# Patient Record
Sex: MALE | Race: WHITE | NOT HISPANIC OR LATINO | Employment: FULL TIME | URBAN - METROPOLITAN AREA
[De-identification: names, ages, dates, MRNs, and addresses within clinical notes are randomized per-mention and may not be internally consistent; named-entity substitution may affect disease eponyms.]

---

## 2017-08-29 ENCOUNTER — TELEPHONE (OUTPATIENT)
Dept: PRIMARY CARE CLINIC | Facility: CLINIC | Age: 22
End: 2017-08-29

## 2017-08-29 NOTE — TELEPHONE ENCOUNTER
----- Message from Lori Ferguson sent at 8/29/2017  1:16 PM CDT -----  Contact: Mother  Katia, mother [phone], Calling for same day appointment, had to leave work today due to abdominal pain, has a history of Colitis. Needs a note for work.  Please advise Thanks.    Pharmacy  [place], [place]

## 2017-08-29 NOTE — TELEPHONE ENCOUNTER
Spoke to mother regarding patients symptoms, advised if they get worse to take patient to ER but appt has been scheduled for tomorrow.

## 2017-09-14 RX ORDER — DEXTROAMPHETAMINE SACCHARATE, AMPHETAMINE ASPARTATE, DEXTROAMPHETAMINE SULFATE AND AMPHETAMINE SULFATE 7.5; 7.5; 7.5; 7.5 MG/1; MG/1; MG/1; MG/1
1 TABLET ORAL 2 TIMES DAILY
Qty: 30 TABLET | Refills: 0 | Status: SHIPPED | OUTPATIENT
Start: 2017-09-14 | End: 2017-10-19 | Stop reason: SDUPTHER

## 2017-09-14 NOTE — TELEPHONE ENCOUNTER
----- Message from Nancy Rivers sent at 9/14/2017  3:54 PM CDT -----  Contact: Mother   Katia, mother calling for patient. Is scheduled on 9/26/17 but will be going to [place] to work and would like to be seen on Friday 9/15/17 for stomach issues and a medication refill. Please advise. thanks.

## 2017-09-14 NOTE — TELEPHONE ENCOUNTER
Rx for 2-week supply of Adderall sent to pharmacy. F/U as scheduled later this month to address GI issues.

## 2017-10-17 ENCOUNTER — TELEPHONE (OUTPATIENT)
Dept: PRIMARY CARE CLINIC | Facility: CLINIC | Age: 22
End: 2017-10-17

## 2017-10-17 NOTE — TELEPHONE ENCOUNTER
Made an appointment with [facility] on Thursday but mom wanted me to send you a message to hopefully squeeze him in somewhere tomorrow?

## 2017-10-17 NOTE — TELEPHONE ENCOUNTER
----- Message from Christine Dye sent at 10/17/2017 11:04 AM CDT -----  Contact: mother kyle   Ear ache   Call back

## 2017-10-18 PROBLEM — F33.9 RECURRENT MAJOR DEPRESSIVE DISORDER: Status: ACTIVE | Noted: 2017-10-18

## 2017-10-18 PROBLEM — K21.9 GERD WITHOUT ESOPHAGITIS: Status: ACTIVE | Noted: 2017-10-18

## 2017-10-18 PROBLEM — F90.0 ATTENTION DEFICIT HYPERACTIVITY DISORDER (ADHD), PREDOMINANTLY INATTENTIVE TYPE: Status: ACTIVE | Noted: 2017-10-18

## 2017-10-18 PROBLEM — K31.84 GASTROPARESIS: Status: ACTIVE | Noted: 2017-10-18

## 2017-10-18 PROBLEM — F31.9 BIPOLAR DISORDER: Status: ACTIVE | Noted: 2017-10-18

## 2017-10-18 PROBLEM — K29.70 GASTRITIS: Status: ACTIVE | Noted: 2017-10-18

## 2017-10-18 PROBLEM — F41.9 ANXIETY: Status: ACTIVE | Noted: 2017-10-18

## 2017-10-18 PROBLEM — E66.3 OVERWEIGHT: Status: ACTIVE | Noted: 2017-10-18

## 2017-10-18 PROBLEM — K58.0 IRRITABLE BOWEL SYNDROME WITH DIARRHEA: Status: ACTIVE | Noted: 2017-10-18

## 2017-10-18 PROBLEM — N13.70 VESICOURETERAL REFLUX: Status: ACTIVE | Noted: 2017-10-18

## 2017-10-18 PROBLEM — R00.0 TACHYCARDIA: Status: ACTIVE | Noted: 2017-10-18

## 2017-10-19 ENCOUNTER — OFFICE VISIT (OUTPATIENT)
Dept: PRIMARY CARE CLINIC | Facility: CLINIC | Age: 22
End: 2017-10-19
Payer: MEDICAID

## 2017-10-19 VITALS
RESPIRATION RATE: 18 BRPM | TEMPERATURE: 98 F | HEART RATE: 74 BPM | DIASTOLIC BLOOD PRESSURE: 80 MMHG | SYSTOLIC BLOOD PRESSURE: 124 MMHG | WEIGHT: 277.19 LBS

## 2017-10-19 DIAGNOSIS — H92.02 ACUTE PAIN OF LEFT EAR: ICD-10-CM

## 2017-10-19 DIAGNOSIS — F90.0 ATTENTION DEFICIT HYPERACTIVITY DISORDER (ADHD), PREDOMINANTLY INATTENTIVE TYPE: Primary | ICD-10-CM

## 2017-10-19 PROCEDURE — 99999 PR PBB SHADOW E&M-EST. PATIENT-LVL III: CPT | Mod: PBBFAC,,, | Performed by: NURSE PRACTITIONER

## 2017-10-19 PROCEDURE — 99214 OFFICE O/P EST MOD 30 MIN: CPT | Mod: S$PBB,,, | Performed by: NURSE PRACTITIONER

## 2017-10-19 PROCEDURE — 99213 OFFICE O/P EST LOW 20 MIN: CPT | Mod: PBBFAC,PN | Performed by: NURSE PRACTITIONER

## 2017-10-19 RX ORDER — DEXTROAMPHETAMINE SACCHARATE, AMPHETAMINE ASPARTATE, DEXTROAMPHETAMINE SULFATE AND AMPHETAMINE SULFATE 7.5; 7.5; 7.5; 7.5 MG/1; MG/1; MG/1; MG/1
1 TABLET ORAL 2 TIMES DAILY
Qty: 60 TABLET | Refills: 0 | Status: SHIPPED | OUTPATIENT
Start: 2017-12-18 | End: 2018-02-09

## 2017-10-19 RX ORDER — DEXTROAMPHETAMINE SACCHARATE, AMPHETAMINE ASPARTATE, DEXTROAMPHETAMINE SULFATE AND AMPHETAMINE SULFATE 7.5; 7.5; 7.5; 7.5 MG/1; MG/1; MG/1; MG/1
1 TABLET ORAL 2 TIMES DAILY
Qty: 60 TABLET | Refills: 0 | Status: SHIPPED | OUTPATIENT
Start: 2017-10-19 | End: 2017-11-29 | Stop reason: SDUPTHER

## 2017-10-19 RX ORDER — DEXTROAMPHETAMINE SACCHARATE, AMPHETAMINE ASPARTATE MONOHYDRATE, DEXTROAMPHETAMINE SULFATE AND AMPHETAMINE SULFATE 7.5; 7.5; 7.5; 7.5 MG/1; MG/1; MG/1; MG/1
30 CAPSULE, EXTENDED RELEASE ORAL 2 TIMES DAILY
Qty: 60 CAPSULE | Refills: 0 | Status: SHIPPED | OUTPATIENT
Start: 2017-11-19 | End: 2017-10-19 | Stop reason: CLARIF

## 2017-10-19 RX ORDER — DEXTROAMPHETAMINE SACCHARATE, AMPHETAMINE ASPARTATE, DEXTROAMPHETAMINE SULFATE AND AMPHETAMINE SULFATE 7.5; 7.5; 7.5; 7.5 MG/1; MG/1; MG/1; MG/1
1 TABLET ORAL 2 TIMES DAILY
Qty: 60 TABLET | Refills: 0 | Status: SHIPPED | OUTPATIENT
Start: 2017-11-19 | End: 2018-01-05 | Stop reason: SDUPTHER

## 2017-10-19 NOTE — PROGRESS NOTES
Chief Complaint  Chief Complaint   Patient presents with    Medication Refill       HPI  Navneet Sanabria is a 21 y.o. male with multiple medical diagnoses as listed in the medical history and problem list that presents for medication refill and left ear pain.  Patient is new to me but is known to this clinic. Requesting medication refill on adderall. Taking BID without noted side effects. Currently out of medication. Has recently traveled out of town and lost the bottle. No CP, palpitations. No weight loss. Sleep good though only 5 hours/night. Concentration increased with medication. Complaining of left ear pain for approximately 2 days. Stated he got dirt of his ear canal. No fevers. No congestion. No cough. Reports sore throat. + smoker 1 ppd. No SOB.     PAST MEDICAL HISTORY:  Past Medical History:   Diagnosis Date    ADHD (attention deficit hyperactivity disorder)        PAST SURGICAL HISTORY:  History reviewed. No pertinent surgical history.    SOCIAL HISTORY:  Social History     Social History    Marital status: Single     Spouse name: N/A    Number of children: N/A    Years of education: N/A     Occupational History    Not on file.     Social History Main Topics    Smoking status: Never Smoker    Smokeless tobacco: Never Used    Alcohol use No    Drug use: No    Sexual activity: Yes     Other Topics Concern    Not on file     Social History Narrative    No narrative on file       FAMILY HISTORY:  Family History   Problem Relation Age of Onset    Family history unknown: Yes       ALLERGIES AND MEDICATIONS: updated and reviewed.  Review of patient's allergies indicates:  No Known Allergies  Current Outpatient Prescriptions   Medication Sig Dispense Refill    dextroamphetamine-amphetamine (ADDERALL) 30 mg Tab Take 1 tablet by mouth 2 (two) times daily. 60 tablet 0    ranitidine (ZANTAC) 300 MG tablet Take 1 tablet by mouth once daily.  2    [START ON 12/18/2017] dextroamphetamine-amphetamine 30 mg  Tab Take 1 tablet by mouth 2 (two) times daily. 60 tablet 0    [START ON 11/19/2017] dextroamphetamine-amphetamine 30 mg Tab Take 1 tablet by mouth 2 (two) times daily. 60 tablet 0     No current facility-administered medications for this visit.          ROS  Review of Systems   Constitutional: Negative for chills, fatigue and fever.   HENT: Positive for ear pain and sore throat. Negative for congestion, rhinorrhea and sinus pressure.    Respiratory: Negative for cough, chest tightness and shortness of breath.    Cardiovascular: Negative for chest pain and palpitations.   Gastrointestinal: Negative for abdominal pain, blood in stool, diarrhea, nausea and vomiting.   Genitourinary: Negative for dysuria, frequency, hematuria and urgency.   Musculoskeletal: Negative for arthralgias and joint swelling.   Skin: Negative for rash and wound.   Neurological: Negative for dizziness and headaches.   Psychiatric/Behavioral: Positive for sleep disturbance. Negative for dysphoric mood. The patient is not nervous/anxious.          PHYSICAL EXAM  Vitals:    10/19/17 1406   BP: 124/80   BP Location: Right arm   Patient Position: Sitting   BP Method: Medium (Automatic)   Pulse: 74   Resp: 18   Temp: 98.1 °F (36.7 °C)   TempSrc: Oral   Weight: 125.7 kg (277 lb 3.2 oz)    There is no height or weight on file to calculate BMI.  Weight: 125.7 kg (277 lb 3.2 oz)         Physical Exam   Constitutional: He is oriented to person, place, and time. He appears well-developed and well-nourished.   HENT:   Head: Normocephalic.   Right Ear: Tympanic membrane normal.   Left Ear: Tympanic membrane normal.   Ears:    Mouth/Throat: Uvula is midline and mucous membranes are normal. Posterior oropharyngeal erythema present. Tonsils are 0 on the right. Tonsils are 0 on the left.   Eyes: Conjunctivae are normal.   Cardiovascular: Normal rate, regular rhythm, normal heart sounds and normal pulses.    No murmur heard.  Pulses:       Radial pulses are 2+  on the right side, and 2+ on the left side.   Pulmonary/Chest: Effort normal and breath sounds normal. He has no wheezes.   Abdominal: Soft. Bowel sounds are normal. There is no tenderness.   Musculoskeletal: He exhibits no edema.   Lymphadenopathy:     He has no cervical adenopathy.   Neurological: He is alert and oriented to person, place, and time.   Skin: Skin is warm and dry. No rash noted.   Psychiatric: He has a normal mood and affect.         Health Maintenance       Date Due Completion Date    Lipid Panel 1997 ---    HPV Vaccines (1 of 3 - Male 3 Dose Series) 12/16/2008 ---    TETANUS VACCINE 12/16/2015 ---    Influenza Vaccine 08/01/2017 ---            Assessment & Plan    Navneet was seen today for medication refill.    Diagnoses and all orders for this visit:    Attention deficit hyperactivity disorder (ADHD), predominantly inattentive type  -     dextroamphetamine-amphetamine (ADDERALL) 30 mg Tab; Take 1 tablet by mouth 2 (two) times daily.  -     dextroamphetamine-amphetamine 30 mg Tab; Take 1 tablet by mouth 2 (two) times daily.  -     dextroamphetamine-amphetamine 30 mg Tab; Take 1 tablet by mouth 2 (two) times daily.    Acute pain of left ear        - Instructed not to place qtip or other objects in ear, causing irritation        - Antihistamines for allergic s/s and decr pressure in TM      Follow-up: Return in about 3 months (around 1/19/2018).

## 2017-11-29 ENCOUNTER — OFFICE VISIT (OUTPATIENT)
Dept: PRIMARY CARE CLINIC | Facility: CLINIC | Age: 22
End: 2017-11-29
Payer: MEDICAID

## 2017-11-29 ENCOUNTER — CLINICAL SUPPORT (OUTPATIENT)
Dept: PRIMARY CARE CLINIC | Facility: CLINIC | Age: 22
End: 2017-11-29
Payer: MEDICAID

## 2017-11-29 VITALS
SYSTOLIC BLOOD PRESSURE: 120 MMHG | DIASTOLIC BLOOD PRESSURE: 75 MMHG | TEMPERATURE: 98 F | BODY MASS INDEX: 35.16 KG/M2 | WEIGHT: 274 LBS | HEART RATE: 94 BPM | OXYGEN SATURATION: 95 % | RESPIRATION RATE: 18 BRPM | HEIGHT: 74 IN

## 2017-11-29 DIAGNOSIS — S39.012A LUMBOSACRAL STRAIN, INITIAL ENCOUNTER: ICD-10-CM

## 2017-11-29 DIAGNOSIS — R30.0 DYSURIA: Primary | ICD-10-CM

## 2017-11-29 PROCEDURE — 87086 URINE CULTURE/COLONY COUNT: CPT

## 2017-11-29 PROCEDURE — 99213 OFFICE O/P EST LOW 20 MIN: CPT | Mod: PBBFAC,PN | Performed by: FAMILY MEDICINE

## 2017-11-29 PROCEDURE — 99214 OFFICE O/P EST MOD 30 MIN: CPT | Mod: S$PBB,,, | Performed by: FAMILY MEDICINE

## 2017-11-29 PROCEDURE — 81001 URINALYSIS AUTO W/SCOPE: CPT | Mod: PBBFAC,PN | Performed by: FAMILY MEDICINE

## 2017-11-29 PROCEDURE — 87591 N.GONORRHOEAE DNA AMP PROB: CPT

## 2017-11-29 PROCEDURE — 99999 PR PBB SHADOW E&M-EST. PATIENT-LVL III: CPT | Mod: PBBFAC,,, | Performed by: FAMILY MEDICINE

## 2017-11-29 RX ORDER — ETODOLAC 400 MG/1
400 TABLET, FILM COATED ORAL 2 TIMES DAILY PRN
Qty: 30 TABLET | Refills: 1 | Status: SHIPPED | OUTPATIENT
Start: 2017-11-29 | End: 2018-01-07 | Stop reason: SDUPTHER

## 2017-11-29 RX ORDER — LAMOTRIGINE 100 MG/1
1 TABLET ORAL DAILY
Refills: 2 | COMMUNITY
Start: 2017-11-20 | End: 2017-12-31 | Stop reason: SDUPTHER

## 2017-11-29 NOTE — PROGRESS NOTES
"Subjective:       Patient ID: Navneet Sanabria is a 21 y.o. male.    Chief Complaint: Back Pain and Dysuria    Dysuria    This is a new problem. The current episode started in the past 7 days. The problem occurs intermittently. The problem has been unchanged. The quality of the pain is described as burning. The pain is mild. There has been no fever. He is sexually active. Pertinent negatives include no chills, discharge, flank pain, frequency, hematuria, sweats, urgency or vomiting. There is no history of kidney stones, STD or a urological procedure.   Back Pain   This is a new problem. The current episode started more than 1 month ago. The problem has been gradually worsening since onset. The pain is present in the lumbar spine. The quality of the pain is described as aching. The pain does not radiate. The symptoms are aggravated by bending. Associated symptoms include dysuria. Pertinent negatives include no bowel incontinence or leg pain. He has tried nothing for the symptoms.     Review of Systems   Constitutional: Negative for chills.   Gastrointestinal: Negative for bowel incontinence and vomiting.   Genitourinary: Positive for dysuria. Negative for flank pain, frequency, hematuria and urgency.   Musculoskeletal: Positive for back pain.       Objective:      Vitals:    11/29/17 1441   BP: 120/75   BP Location: Left arm   Patient Position: Sitting   BP Method: Large (Automatic)   Pulse: 94   Resp: 18   Temp: 98.1 °F (36.7 °C)   TempSrc: Oral   SpO2: 95%   Weight: 124.3 kg (274 lb)   Height: 6' 2" (1.88 m)     Physical Exam   Constitutional: He is oriented to person, place, and time. He appears well-developed and well-nourished.   HENT:   Head: Normocephalic and atraumatic.   Neck: Neck supple. No JVD present.   Cardiovascular: Normal rate, regular rhythm and normal heart sounds.    Pulmonary/Chest: Effort normal and breath sounds normal.   Abdominal: Soft. He exhibits no distension. There is no tenderness. There is " no CVA tenderness.   Musculoskeletal: He exhibits no edema.        Lumbar back: He exhibits no tenderness, no bony tenderness, no edema, no deformity and no spasm.   Neurological: He is alert and oriented to person, place, and time. He has normal strength and normal reflexes. No sensory deficit.   Skin: Skin is warm and dry.   Nursing note and vitals reviewed.      Assessment:       1. Dysuria    2. Lumbosacral strain, initial encounter        Plan:       Dysuria  Comments:  no evidence of UTI; will get culture, STD test  Orders:  -     POCT urinalysis, dipstick or tablet reag  -     Urine culture  -     C. trachomatis/N. gonorrhoeae by AMP DNA Urine    Lumbosacral strain, initial encounter  -     etodolac (LODINE) 400 MG tablet; Take 1 tablet (400 mg total) by mouth 2 (two) times daily as needed (pain).  Dispense: 30 tablet; Refill: 1      Medication List with Changes/Refills   New Medications    ETODOLAC (LODINE) 400 MG TABLET    Take 1 tablet (400 mg total) by mouth 2 (two) times daily as needed (pain).   Current Medications    DEXTROAMPHETAMINE-AMPHETAMINE 30 MG TAB    Take 1 tablet by mouth 2 (two) times daily.    DEXTROAMPHETAMINE-AMPHETAMINE 30 MG TAB    Take 1 tablet by mouth 2 (two) times daily.    LAMOTRIGINE (LAMICTAL) 100 MG TABLET    Take 1 tablet by mouth once daily.    RANITIDINE (ZANTAC) 300 MG TABLET    Take 1 tablet by mouth once daily.   Discontinued Medications    DEXTROAMPHETAMINE-AMPHETAMINE (ADDERALL) 30 MG TAB    Take 1 tablet by mouth 2 (two) times daily.

## 2017-11-30 LAB
BACTERIA UR CULT: NORMAL
BILIRUB SERPL-MCNC: NORMAL MG/DL
BLOOD URINE, POC: NORMAL
C TRACH DNA SPEC QL NAA+PROBE: NOT DETECTED
COLOR, POC UA: YELLOW
GLUCOSE UR QL STRIP: NORMAL
KETONES UR QL STRIP: NORMAL
LEUKOCYTE ESTERASE URINE, POC: NORMAL
N GONORRHOEA DNA SPEC QL NAA+PROBE: NOT DETECTED
NITRITE, POC UA: NORMAL
PH, POC UA: NORMAL
PROTEIN, POC: NORMAL
SPECIFIC GRAVITY, POC UA: 5
UROBILINOGEN, POC UA: NORMAL

## 2018-01-02 RX ORDER — LAMOTRIGINE 100 MG/1
TABLET ORAL
Qty: 30 TABLET | Refills: 5 | Status: SHIPPED | OUTPATIENT
Start: 2018-01-02 | End: 2018-06-05 | Stop reason: SDUPTHER

## 2018-01-05 RX ORDER — DEXTROAMPHETAMINE SACCHARATE, AMPHETAMINE ASPARTATE, DEXTROAMPHETAMINE SULFATE AND AMPHETAMINE SULFATE 7.5; 7.5; 7.5; 7.5 MG/1; MG/1; MG/1; MG/1
1 TABLET ORAL 2 TIMES DAILY
Qty: 60 TABLET | Refills: 0 | Status: SHIPPED | OUTPATIENT
Start: 2018-01-05 | End: 2018-02-09

## 2018-01-07 DIAGNOSIS — S39.012A LUMBOSACRAL STRAIN, INITIAL ENCOUNTER: ICD-10-CM

## 2018-01-08 RX ORDER — ETODOLAC 400 MG/1
TABLET, FILM COATED ORAL
Qty: 30 TABLET | Refills: 0 | Status: SHIPPED | OUTPATIENT
Start: 2018-01-08 | End: 2018-01-10 | Stop reason: ALTCHOICE

## 2018-01-10 ENCOUNTER — OFFICE VISIT (OUTPATIENT)
Dept: PRIMARY CARE CLINIC | Facility: CLINIC | Age: 23
End: 2018-01-10
Payer: MEDICAID

## 2018-01-10 VITALS
RESPIRATION RATE: 20 BRPM | BODY MASS INDEX: 33.03 KG/M2 | DIASTOLIC BLOOD PRESSURE: 68 MMHG | SYSTOLIC BLOOD PRESSURE: 124 MMHG | HEART RATE: 91 BPM | OXYGEN SATURATION: 98 % | HEIGHT: 74 IN | TEMPERATURE: 98 F | WEIGHT: 257.38 LBS

## 2018-01-10 DIAGNOSIS — M79.672 LEFT FOOT PAIN: Primary | ICD-10-CM

## 2018-01-10 DIAGNOSIS — Z71.6 ENCOUNTER FOR SMOKING CESSATION COUNSELING: ICD-10-CM

## 2018-01-10 PROCEDURE — 99214 OFFICE O/P EST MOD 30 MIN: CPT | Mod: PBBFAC,PN | Performed by: NURSE PRACTITIONER

## 2018-01-10 PROCEDURE — 99999 PR PBB SHADOW E&M-EST. PATIENT-LVL IV: CPT | Mod: PBBFAC,,, | Performed by: NURSE PRACTITIONER

## 2018-01-10 PROCEDURE — 99214 OFFICE O/P EST MOD 30 MIN: CPT | Mod: S$PBB,,, | Performed by: NURSE PRACTITIONER

## 2018-01-10 RX ORDER — BUPROPION HYDROCHLORIDE 150 MG/1
150 TABLET, EXTENDED RELEASE ORAL 2 TIMES DAILY
Qty: 60 TABLET | Refills: 2 | Status: SHIPPED | OUTPATIENT
Start: 2018-01-10 | End: 2018-02-09

## 2018-01-10 RX ORDER — NAPROXEN 500 MG/1
500 TABLET ORAL 2 TIMES DAILY
Qty: 30 TABLET | Refills: 0 | Status: SHIPPED | OUTPATIENT
Start: 2018-01-10 | End: 2018-01-22 | Stop reason: SDUPTHER

## 2018-01-10 NOTE — PROGRESS NOTES
Chief Complaint  Chief Complaint   Patient presents with    Foot Pain     left foot at arch and right foot little toe       HPI  Navneet Sanabria is a 22 y.o. male with multiple medical diagnoses as listed in the medical history and problem list that presents for left foot pain.  Patient is new to me but is known to this clinic with her last appointment being 11/29/2017.  Complains of new onset left foot pain X 1 week. No associated trauma or fall. Pain with toe flexion and extension. Pain with walking. No associated swelling. Noted bruising initially. Patient taking ibuprofen 200 mg with no noted relief. H/o smoking for past 4 years 1/2 ppd. No shortness of breath. Expresses interest in quitting smoking at this time. H/o MDD/bipolar currently on lamictal. Patient denies cp, palpitations.       PAST MEDICAL HISTORY:  Past Medical History:   Diagnosis Date    ADHD (attention deficit hyperactivity disorder)        PAST SURGICAL HISTORY:  Past Surgical History:   Procedure Laterality Date    KIDNEY SURGERY         SOCIAL HISTORY:  Social History     Social History    Marital status: Single     Spouse name: N/A    Number of children: N/A    Years of education: N/A     Occupational History    Not on file.     Social History Main Topics    Smoking status: Never Smoker    Smokeless tobacco: Never Used    Alcohol use No    Drug use: No    Sexual activity: Yes     Other Topics Concern    Not on file     Social History Narrative    No narrative on file       FAMILY HISTORY:  Family History   Problem Relation Age of Onset    Family history unknown: Yes       ALLERGIES AND MEDICATIONS: updated and reviewed.  Review of patient's allergies indicates:  No Known Allergies  Current Outpatient Prescriptions   Medication Sig Dispense Refill    dextroamphetamine-amphetamine 30 mg Tab Take 1 tablet by mouth 2 (two) times daily. 60 tablet 0    dextroamphetamine-amphetamine 30 mg Tab Take 1 tablet by mouth 2 (two) times daily.  "60 tablet 0    lamoTRIgine (LAMICTAL) 100 MG tablet TAKE 1 TABLET BY MOUTH EVERY DAY 30 tablet 5    ranitidine (ZANTAC) 300 MG tablet TAKE 1 TABLET BY MOUTH EVERY DAY 30 tablet 2    buPROPion (WELLBUTRIN SR) 150 MG TBSR 12 hr tablet Take 1 tablet (150 mg total) by mouth 2 (two) times daily. 60 tablet 2    naproxen (NAPROSYN) 500 MG tablet Take 1 tablet (500 mg total) by mouth 2 (two) times daily. 30 tablet 0     No current facility-administered medications for this visit.          ROS  Review of Systems   Constitutional: Negative for chills, fatigue and fever.   HENT: Negative for congestion, rhinorrhea, sinus pressure and sore throat.    Respiratory: Positive for cough. Negative for chest tightness and shortness of breath.    Cardiovascular: Negative for chest pain and palpitations.   Gastrointestinal: Negative for abdominal pain, blood in stool, diarrhea, nausea and vomiting.   Genitourinary: Negative for dysuria, frequency, hematuria and urgency.   Musculoskeletal: Positive for arthralgias, gait problem and joint swelling.   Skin: Negative for rash and wound.   Neurological: Negative for dizziness and headaches.   Psychiatric/Behavioral: Negative for dysphoric mood and sleep disturbance. The patient is not nervous/anxious.          PHYSICAL EXAM  Vitals:    01/10/18 1500   BP: 124/68   BP Location: Right arm   Patient Position: Sitting   BP Method: Medium (Automatic)   Pulse: 91   Resp: 20   Temp: 98.1 °F (36.7 °C)   SpO2: 98%   Weight: 116.8 kg (257 lb 6.4 oz)   Height: 6' 2" (1.88 m)    Body mass index is 33.05 kg/m².  Weight: 116.8 kg (257 lb 6.4 oz)   Height: 6' 2" (188 cm)     Physical Exam   Constitutional: He is oriented to person, place, and time. He appears well-developed and well-nourished.   HENT:   Head: Normocephalic.   Right Ear: Tympanic membrane normal.   Left Ear: Tympanic membrane normal.   Mouth/Throat: Uvula is midline, oropharynx is clear and moist and mucous membranes are normal.   Eyes: " Conjunctivae are normal.   Cardiovascular: Normal rate, regular rhythm, normal heart sounds and normal pulses.    No murmur heard.  Pulses:       Radial pulses are 2+ on the right side, and 2+ on the left side.   Pulmonary/Chest: Effort normal. He has wheezes. He has no rhonchi.   Abdominal: Soft. Bowel sounds are normal. There is no tenderness.   Musculoskeletal: He exhibits no edema.        Feet:    Lymphadenopathy:     He has no cervical adenopathy.   Neurological: He is alert and oriented to person, place, and time.   Skin: Skin is warm and dry. No rash noted.   Psychiatric: He has a normal mood and affect.         Health Maintenance       Date Due Completion Date    Lipid Panel 1997 ---    HPV Vaccines (1 of 3 - Male 3 Dose Series) 12/16/2008 ---    TETANUS VACCINE 12/16/2015 ---    Influenza Vaccine 08/01/2017 ---            Assessment & Plan    Navneet was seen today for foot pain.    Diagnoses and all orders for this visit:    Left foot pain  -     X-Ray Foot Complete 3 view Left; Future  -     naproxen (NAPROSYN) 500 MG tablet; Take 1 tablet (500 mg total) by mouth 2 (two) times daily.    Encounter for smoking cessation counseling  -     buPROPion (WELLBUTRIN SR) 150 MG TBSR 12 hr tablet; Take 1 tablet (150 mg total) by mouth 2 (two) times daily.  - Patient does not qualify for cessation trust        Follow-up: Return if symptoms worsen or fail to improve.

## 2018-01-22 DIAGNOSIS — M79.672 LEFT FOOT PAIN: ICD-10-CM

## 2018-01-22 DIAGNOSIS — S39.012A LUMBOSACRAL STRAIN, INITIAL ENCOUNTER: ICD-10-CM

## 2018-01-22 RX ORDER — ETODOLAC 400 MG/1
TABLET, FILM COATED ORAL
Qty: 30 TABLET | Refills: 0 | Status: SHIPPED | OUTPATIENT
Start: 2018-01-22 | End: 2018-02-09

## 2018-01-22 RX ORDER — NAPROXEN 500 MG/1
TABLET ORAL
Qty: 30 TABLET | Refills: 0 | Status: SHIPPED | OUTPATIENT
Start: 2018-01-22 | End: 2018-05-15

## 2018-02-09 ENCOUNTER — TELEPHONE (OUTPATIENT)
Dept: PRIMARY CARE CLINIC | Facility: CLINIC | Age: 23
End: 2018-02-09

## 2018-02-09 NOTE — TELEPHONE ENCOUNTER
Spoke to pts mom and she states that the pt is urinating blood and he only has one kidney. I notified the mom that the pt needs to go to the ER because Dr Reddy is not in the office. She states understanding

## 2018-02-20 ENCOUNTER — TELEPHONE (OUTPATIENT)
Dept: PRIMARY CARE CLINIC | Facility: CLINIC | Age: 23
End: 2018-02-20

## 2018-02-20 NOTE — TELEPHONE ENCOUNTER
----- Message from Lori Ferguson sent at 2/9/2018  9:23 AM CST -----  Contact: Mother  Katia, mother [phone], Calling because her son only has one kidney, he woke of this morning with blood in his urine. Mother is very concerned. Please call her. Thanks.

## 2018-03-06 ENCOUNTER — OFFICE VISIT (OUTPATIENT)
Dept: PRIMARY CARE CLINIC | Facility: CLINIC | Age: 23
End: 2018-03-06
Payer: MEDICAID

## 2018-03-06 VITALS
DIASTOLIC BLOOD PRESSURE: 73 MMHG | HEART RATE: 76 BPM | OXYGEN SATURATION: 97 % | BODY MASS INDEX: 31.06 KG/M2 | TEMPERATURE: 98 F | RESPIRATION RATE: 18 BRPM | WEIGHT: 242 LBS | HEIGHT: 74 IN | SYSTOLIC BLOOD PRESSURE: 115 MMHG

## 2018-03-06 DIAGNOSIS — F90.0 ATTENTION DEFICIT HYPERACTIVITY DISORDER (ADHD), PREDOMINANTLY INATTENTIVE TYPE: Primary | ICD-10-CM

## 2018-03-06 DIAGNOSIS — N13.70 VESICOURETERAL REFLUX: ICD-10-CM

## 2018-03-06 LAB
BILIRUB SERPL-MCNC: NORMAL MG/DL
BLOOD URINE, POC: NORMAL
COLOR, POC UA: YELLOW
GLUCOSE UR QL STRIP: NORMAL
KETONES UR QL STRIP: NORMAL
LEUKOCYTE ESTERASE URINE, POC: NORMAL
NITRITE, POC UA: NORMAL
PH, POC UA: 5
PROTEIN, POC: NORMAL
SPECIFIC GRAVITY, POC UA: 1
UROBILINOGEN, POC UA: NORMAL

## 2018-03-06 PROCEDURE — 99213 OFFICE O/P EST LOW 20 MIN: CPT | Mod: S$PBB,,, | Performed by: FAMILY MEDICINE

## 2018-03-06 PROCEDURE — 81001 URINALYSIS AUTO W/SCOPE: CPT | Mod: PBBFAC,PN | Performed by: FAMILY MEDICINE

## 2018-03-06 PROCEDURE — 99213 OFFICE O/P EST LOW 20 MIN: CPT | Mod: PBBFAC,PN | Performed by: FAMILY MEDICINE

## 2018-03-06 PROCEDURE — 99999 PR PBB SHADOW E&M-EST. PATIENT-LVL III: CPT | Mod: PBBFAC,,, | Performed by: FAMILY MEDICINE

## 2018-03-06 RX ORDER — DEXTROAMPHETAMINE SACCHARATE, AMPHETAMINE ASPARTATE, DEXTROAMPHETAMINE SULFATE AND AMPHETAMINE SULFATE 7.5; 7.5; 7.5; 7.5 MG/1; MG/1; MG/1; MG/1
1 TABLET ORAL 2 TIMES DAILY
Qty: 60 TABLET | Refills: 0 | Status: SHIPPED | OUTPATIENT
Start: 2018-04-05 | End: 2018-05-15

## 2018-03-06 RX ORDER — DEXTROAMPHETAMINE SACCHARATE, AMPHETAMINE ASPARTATE, DEXTROAMPHETAMINE SULFATE AND AMPHETAMINE SULFATE 7.5; 7.5; 7.5; 7.5 MG/1; MG/1; MG/1; MG/1
1 TABLET ORAL 2 TIMES DAILY
COMMUNITY
End: 2018-03-06 | Stop reason: SDUPTHER

## 2018-03-06 RX ORDER — DEXTROAMPHETAMINE SACCHARATE, AMPHETAMINE ASPARTATE, DEXTROAMPHETAMINE SULFATE AND AMPHETAMINE SULFATE 7.5; 7.5; 7.5; 7.5 MG/1; MG/1; MG/1; MG/1
1 TABLET ORAL 2 TIMES DAILY
Qty: 60 TABLET | Refills: 0 | Status: SHIPPED | OUTPATIENT
Start: 2018-03-06 | End: 2018-05-15

## 2018-03-06 RX ORDER — DEXTROAMPHETAMINE SACCHARATE, AMPHETAMINE ASPARTATE, DEXTROAMPHETAMINE SULFATE AND AMPHETAMINE SULFATE 7.5; 7.5; 7.5; 7.5 MG/1; MG/1; MG/1; MG/1
1 TABLET ORAL 2 TIMES DAILY
Qty: 60 TABLET | Refills: 0 | Status: SHIPPED | OUTPATIENT
Start: 2018-05-05 | End: 2018-07-26 | Stop reason: ALTCHOICE

## 2018-03-06 NOTE — PROGRESS NOTES
"Subjective:       Patient ID: Navneet Sanabria is a 22 y.o. male.    Chief Complaint: Medication Refill and Hematuria (pt was seen in the ER on 02/09. He is not having this issue anymore )    Patient presents for ADHD medication refill.  Prescribed medication has been working well with regard to efficacy (improved focus and attention, less easily distracted).  Tolerating medication well without significant adverse side effects (loss of appetite, insomnia, irritability, chest pain/palpitations).  History of vesicoureteral reflux, hasn't seen urologist in years.  Had an episode of gross hematuria several weeks ago, went to the emergency room.  CT done at that time showed no acute abnormalities.  No recurrent hematuria.  Does report occasional urinary urgency and discomfort.  No fevers or chills.      Review of Systems   Constitutional: Negative for chills and fever.   Eyes: Negative for visual disturbance.   Cardiovascular: Negative for chest pain and palpitations.   Genitourinary: Positive for hematuria.   Psychiatric/Behavioral: Negative for sleep disturbance.       Objective:      Vitals:    03/06/18 1100   BP: 115/73   BP Location: Left arm   Patient Position: Sitting   BP Method: Large (Automatic)   Pulse: 76   Resp: 18   Temp: 97.9 °F (36.6 °C)   TempSrc: Oral   SpO2: 97%   Weight: 109.8 kg (242 lb)   Height: 6' 2" (1.88 m)     Physical Exam   Constitutional: He is oriented to person, place, and time. He appears well-developed and well-nourished.   HENT:   Head: Normocephalic and atraumatic.   Cardiovascular: Normal rate, regular rhythm and normal heart sounds.    Pulmonary/Chest: Effort normal and breath sounds normal.   Abdominal: Soft. Bowel sounds are normal. There is no tenderness. There is no CVA tenderness.   Musculoskeletal: He exhibits no edema.   Neurological: He is alert and oriented to person, place, and time.   Skin: Skin is warm and dry.   Nursing note and vitals reviewed.      Assessment:       1. " Attention deficit hyperactivity disorder (ADHD), predominantly inattentive type    2. Vesicoureteral reflux        Plan:       Attention deficit hyperactivity disorder (ADHD), predominantly inattentive type  -     dextroamphetamine-amphetamine (ADDERALL) 30 mg Tab; Take 1 tablet by mouth 2 (two) times daily.  Dispense: 60 tablet; Refill: 0  -     dextroamphetamine-amphetamine 30 mg Tab; Take 1 tablet by mouth 2 (two) times daily.  Dispense: 60 tablet; Refill: 0  -     dextroamphetamine-amphetamine 30 mg Tab; Take 1 tablet by mouth 2 (two) times daily.  Dispense: 60 tablet; Refill: 0    Vesicoureteral reflux  -     POCT urinalysis, dipstick or tablet reag  -     Ambulatory referral to Urology      Medication List with Changes/Refills   New Medications    DEXTROAMPHETAMINE-AMPHETAMINE 30 MG TAB    Take 1 tablet by mouth 2 (two) times daily.    DEXTROAMPHETAMINE-AMPHETAMINE 30 MG TAB    Take 1 tablet by mouth 2 (two) times daily.   Current Medications    LAMOTRIGINE (LAMICTAL) 100 MG TABLET    TAKE 1 TABLET BY MOUTH EVERY DAY    NAPROXEN (NAPROSYN) 500 MG TABLET    TAKE 1 TABLET(500 MG) BY MOUTH TWICE DAILY    RANITIDINE (ZANTAC) 300 MG TABLET    TAKE 1 TABLET BY MOUTH EVERY DAY   Changed and/or Refilled Medications    Modified Medication Previous Medication    DEXTROAMPHETAMINE-AMPHETAMINE (ADDERALL) 30 MG TAB dextroamphetamine-amphetamine (ADDERALL) 30 mg Tab       Take 1 tablet by mouth 2 (two) times daily.    Take 1 tablet by mouth 2 (two) times daily.   Discontinued Medications    TAMSULOSIN (FLOMAX) 0.4 MG CP24    Take 1 capsule (0.4 mg total) by mouth once daily.

## 2018-04-11 DIAGNOSIS — Z71.6 ENCOUNTER FOR SMOKING CESSATION COUNSELING: ICD-10-CM

## 2018-04-11 RX ORDER — BUPROPION HYDROCHLORIDE 150 MG/1
TABLET, EXTENDED RELEASE ORAL
Qty: 60 TABLET | Refills: 0 | Status: SHIPPED | OUTPATIENT
Start: 2018-04-11 | End: 2018-05-10 | Stop reason: SDUPTHER

## 2018-05-10 DIAGNOSIS — Z71.6 ENCOUNTER FOR SMOKING CESSATION COUNSELING: ICD-10-CM

## 2018-05-10 RX ORDER — BUPROPION HYDROCHLORIDE 150 MG/1
TABLET, EXTENDED RELEASE ORAL
Qty: 60 TABLET | Refills: 0 | Status: SHIPPED | OUTPATIENT
Start: 2018-05-10 | End: 2018-05-15

## 2018-05-15 ENCOUNTER — CLINICAL SUPPORT (OUTPATIENT)
Dept: PRIMARY CARE CLINIC | Facility: CLINIC | Age: 23
End: 2018-05-15
Payer: MEDICAID

## 2018-05-15 ENCOUNTER — OFFICE VISIT (OUTPATIENT)
Dept: PRIMARY CARE CLINIC | Facility: CLINIC | Age: 23
End: 2018-05-15
Payer: MEDICAID

## 2018-05-15 VITALS
SYSTOLIC BLOOD PRESSURE: 121 MMHG | WEIGHT: 241 LBS | BODY MASS INDEX: 30.93 KG/M2 | OXYGEN SATURATION: 97 % | TEMPERATURE: 98 F | HEART RATE: 64 BPM | RESPIRATION RATE: 18 BRPM | DIASTOLIC BLOOD PRESSURE: 70 MMHG | HEIGHT: 74 IN

## 2018-05-15 DIAGNOSIS — R00.2 PALPITATIONS: ICD-10-CM

## 2018-05-15 DIAGNOSIS — Z79.899 HIGH RISK MEDICATION USE: ICD-10-CM

## 2018-05-15 DIAGNOSIS — R42 DIZZINESS: Primary | ICD-10-CM

## 2018-05-15 DIAGNOSIS — R42 DIZZINESS: ICD-10-CM

## 2018-05-15 LAB
ALBUMIN SERPL BCP-MCNC: 3.9 G/DL
ALP SERPL-CCNC: 89 U/L
ALT SERPL W/O P-5'-P-CCNC: 13 U/L
ANION GAP SERPL CALC-SCNC: 8 MMOL/L
AST SERPL-CCNC: 15 U/L
BASOPHILS # BLD AUTO: 0.06 K/UL
BASOPHILS NFR BLD: 0.9 %
BILIRUB SERPL-MCNC: 0.3 MG/DL
BUN SERPL-MCNC: 5 MG/DL
CALCIUM SERPL-MCNC: 9.1 MG/DL
CHLORIDE SERPL-SCNC: 105 MMOL/L
CO2 SERPL-SCNC: 27 MMOL/L
CREAT SERPL-MCNC: 1 MG/DL
DIFFERENTIAL METHOD: NORMAL
EOSINOPHIL # BLD AUTO: 0.2 K/UL
EOSINOPHIL NFR BLD: 3.4 %
ERYTHROCYTE [DISTWIDTH] IN BLOOD BY AUTOMATED COUNT: 13.7 %
EST. GFR  (AFRICAN AMERICAN): >60 ML/MIN/1.73 M^2
EST. GFR  (NON AFRICAN AMERICAN): >60 ML/MIN/1.73 M^2
GLUCOSE SERPL-MCNC: 98 MG/DL
HCT VFR BLD AUTO: 44.3 %
HGB BLD-MCNC: 14.5 G/DL
IMM GRANULOCYTES # BLD AUTO: 0.02 K/UL
IMM GRANULOCYTES NFR BLD AUTO: 0.3 %
LYMPHOCYTES # BLD AUTO: 1.6 K/UL
LYMPHOCYTES NFR BLD: 25 %
MCH RBC QN AUTO: 29.5 PG
MCHC RBC AUTO-ENTMCNC: 32.7 G/DL
MCV RBC AUTO: 90 FL
MONOCYTES # BLD AUTO: 0.6 K/UL
MONOCYTES NFR BLD: 8.8 %
NEUTROPHILS # BLD AUTO: 4 K/UL
NEUTROPHILS NFR BLD: 61.6 %
NRBC BLD-RTO: 0 /100 WBC
PLATELET # BLD AUTO: 237 K/UL
PMV BLD AUTO: 11.2 FL
POTASSIUM SERPL-SCNC: 4.1 MMOL/L
PROT SERPL-MCNC: 6.6 G/DL
RBC # BLD AUTO: 4.92 M/UL
SODIUM SERPL-SCNC: 140 MMOL/L
TSH SERPL DL<=0.005 MIU/L-ACNC: 1.76 UIU/ML
WBC # BLD AUTO: 6.49 K/UL

## 2018-05-15 PROCEDURE — 99999 PR PBB SHADOW E&M-EST. PATIENT-LVL II: CPT | Mod: PBBFAC,,,

## 2018-05-15 PROCEDURE — 80053 COMPREHEN METABOLIC PANEL: CPT

## 2018-05-15 PROCEDURE — 99999 PR PBB SHADOW E&M-EST. PATIENT-LVL III: CPT | Mod: PBBFAC,,, | Performed by: FAMILY MEDICINE

## 2018-05-15 PROCEDURE — 80307 DRUG TEST PRSMV CHEM ANLYZR: CPT

## 2018-05-15 PROCEDURE — 85025 COMPLETE CBC W/AUTO DIFF WBC: CPT

## 2018-05-15 PROCEDURE — 99212 OFFICE O/P EST SF 10 MIN: CPT | Mod: PBBFAC,27,PN

## 2018-05-15 PROCEDURE — 99213 OFFICE O/P EST LOW 20 MIN: CPT | Mod: PBBFAC,PN | Performed by: FAMILY MEDICINE

## 2018-05-15 PROCEDURE — 84443 ASSAY THYROID STIM HORMONE: CPT

## 2018-05-15 PROCEDURE — 99213 OFFICE O/P EST LOW 20 MIN: CPT | Mod: S$PBB,,, | Performed by: FAMILY MEDICINE

## 2018-05-15 NOTE — PROGRESS NOTES
"Subjective:       Patient ID: Navneet Sanabria is a 22 y.o. male.    Chief Complaint: Dizziness (x1 week) and Palpitations (x1 week)    Started feeling dizzy off and on last Friday.  First happened while seated at rest, then again in the shower.  Thought he was going to pass out, though no syncope.  Since then, has had a few more less severe dizzy spells, but has also been having palpitations and chest tightness, nonexertional.  No recent illness or injury.      Review of Systems   Constitutional: Negative for fever.   HENT: Negative for trouble swallowing.    Eyes: Negative for visual disturbance.   Respiratory: Positive for chest tightness. Negative for shortness of breath.    Cardiovascular: Positive for palpitations. Negative for leg swelling.   Gastrointestinal: Negative for nausea and vomiting.   Genitourinary: Negative for difficulty urinating.   Musculoskeletal: Negative for joint swelling.   Skin: Negative for rash.   Neurological: Positive for dizziness. Negative for seizures, syncope and headaches.   Psychiatric/Behavioral: Negative for agitation and confusion.       Objective:      Vitals:    05/15/18 1252   BP: 121/70   BP Location: Left arm   Patient Position: Sitting   BP Method: Large (Automatic)   Pulse: 64   Resp: 18   Temp: 98.4 °F (36.9 °C)   TempSrc: Oral   SpO2: 97%   Weight: 109.3 kg (241 lb)   Height: 6' 2" (1.88 m)     Physical Exam   Constitutional: He is oriented to person, place, and time. He appears well-developed and well-nourished.   HENT:   Head: Normocephalic and atraumatic.   Mouth/Throat: Oropharynx is clear and moist.   Eyes: EOM are normal. Pupils are equal, round, and reactive to light.   Neck: Neck supple. No JVD present. Carotid bruit is not present.   Cardiovascular: Normal rate, regular rhythm and normal heart sounds.    Pulses:       Radial pulses are 2+ on the right side, and 2+ on the left side.   Pulmonary/Chest: Effort normal and breath sounds normal.   Abdominal: Soft. " Bowel sounds are normal. There is no tenderness.   Musculoskeletal: He exhibits no edema.   Neurological: He is alert and oriented to person, place, and time.   Skin: Skin is warm and dry.   Psychiatric: He has a normal mood and affect. His behavior is normal.   Nursing note and vitals reviewed.      Assessment:       1. Dizziness    2. Palpitations    3. High risk medication use        Plan:       Dizziness  -     CBC auto differential; Future; Expected date: 05/15/2018  -     Comprehensive metabolic panel; Future; Expected date: 05/15/2018  -     Transthoracic echo (TTE) complete; Future  -     Holter monitor - 24 hour; Future    Palpitations  Comments:  EKG normal.  Patient advised to hold Adderall pending outcome of cardiac workup  Orders:  -     CBC auto differential; Future; Expected date: 05/15/2018  -     Comprehensive metabolic panel; Future; Expected date: 05/15/2018  -     TSH; Future; Expected date: 05/15/2018  -     POCT EKG 12-LEAD (NOT FOR [facility] USE)  -     Transthoracic echo (TTE) complete; Future  -     Holter monitor - 24 hour; Future    High risk medication use  -     [facility] Drug Screen      Medication List with Changes/Refills   Current Medications    DEXTROAMPHETAMINE-AMPHETAMINE 30 MG TAB    Take 1 tablet by mouth 2 (two) times daily.    LAMOTRIGINE (LAMICTAL) 100 MG TABLET    TAKE 1 TABLET BY MOUTH EVERY DAY    RANITIDINE (ZANTAC) 300 MG TABLET    TAKE 1 TABLET BY MOUTH EVERY DAY   Discontinued Medications    BUPROPION (WELLBUTRIN SR) 150 MG TBSR 12 HR TABLET    TAKE 1 TABLET(150 MG) BY MOUTH TWICE DAILY    DEXTROAMPHETAMINE-AMPHETAMINE (ADDERALL) 30 MG TAB    Take 1 tablet by mouth 2 (two) times daily.    DEXTROAMPHETAMINE-AMPHETAMINE 30 MG TAB    Take 1 tablet by mouth 2 (two) times daily.    NAPROXEN (NAPROSYN) 500 MG TABLET    TAKE 1 TABLET(500 MG) BY MOUTH TWICE DAILY

## 2018-05-18 LAB
6MAM UR QL: NOT DETECTED
7AMINOCLONAZEPAM UR QL: NOT DETECTED
A-OH ALPRAZ UR QL: NOT DETECTED
ALPRAZ UR QL: NOT DETECTED
AMPHET UR QL SCN: PRESENT
ANNOTATION COMMENT IMP: NORMAL
ANNOTATION COMMENT IMP: NORMAL
BARBITURATES UR QL: NOT DETECTED
BUPRENORPHINE UR QL: NOT DETECTED
BZE UR QL: NOT DETECTED
CARBOXYTHC UR QL: PRESENT
CARISOPRODOL UR QL: NOT DETECTED
CLONAZEPAM UR QL: NOT DETECTED
CODEINE UR QL: NOT DETECTED
CREAT UR-MCNC: 75.4 MG/DL (ref 20–400)
DIAZEPAM UR QL: NOT DETECTED
ETHYL GLUCURONIDE UR QL: NOT DETECTED
FENTANYL UR QL: NOT DETECTED
HYDROCODONE UR QL: PRESENT
HYDROMORPHONE UR QL: NOT DETECTED
LORAZEPAM UR QL: NOT DETECTED
MDA UR QL: NOT DETECTED
MDEA UR QL: NOT DETECTED
MDMA UR QL: NOT DETECTED
ME-PHENIDATE UR QL: NOT DETECTED
MEPERIDINE UR QL: NOT DETECTED
METHADONE UR QL: NOT DETECTED
METHAMPHET UR QL: NOT DETECTED
MIDAZOLAM UR QL SCN: NOT DETECTED
MORPHINE UR QL: NOT DETECTED
NORBUPRENORPHINE UR QL CFM: NOT DETECTED
NORDIAZEPAM UR QL: NOT DETECTED
NORFENTANYL UR QL: NOT DETECTED
NORHYDROCODONE UR QL CFM: PRESENT
NOROXYCODONE UR QL CFM: PRESENT
NOROXYMORPHONE: NOT DETECTED
OXAZEPAM UR QL: NOT DETECTED
OXYCODONE UR QL: PRESENT
OXYMORPHONE UR QL: NOT DETECTED
PATHOLOGY STUDY: NORMAL
PCP UR QL: NOT DETECTED
PHENTERMINE UR QL: NOT DETECTED
PROPOXYPH UR QL: NOT DETECTED
SERVICE CMNT-IMP: NORMAL
TAPENTADOL UR QL SCN: NOT DETECTED
TAPENTADOL-O-SULF: NOT DETECTED
TEMAZEPAM UR QL: NOT DETECTED
TRAMADOL UR QL: NOT DETECTED
ZOLPIDEM UR QL: NOT DETECTED

## 2018-05-21 ENCOUNTER — TELEPHONE (OUTPATIENT)
Dept: PRIMARY CARE CLINIC | Facility: CLINIC | Age: 23
End: 2018-05-21

## 2018-05-21 DIAGNOSIS — R89.2 ABNORMAL DRUG SCREEN: ICD-10-CM

## 2018-05-21 NOTE — TELEPHONE ENCOUNTER
Patient says he saw a cardiologist and was started on Metoprolol a while back because the Adderall was causing his HR to be elevated and gives him palpitations. I notified that patient that his pulse has been fine as well as his BP but I would ask you.

## 2018-05-21 NOTE — TELEPHONE ENCOUNTER
I have never prescribed metoprolol for him, nor do I feel it is appropriate to prescribe a medication to treat a side effect of another medication.  It would be best to get off the offending agent (stop taking Adderall).  In any case, his urine drug screen was positive for hydrocodone and oxycodone, as well as Adderall and marijuana.  I am not particularly concerned about the marijuana, but there is no record in the [place] prescription monitoring database indicating that he has been prescribed oxycodone any time in the past 2 years.  There are some prescriptions for hydrocodone from a dentist.  Because of this, I will no longer be prescribing Adderall or any other controlled substances for him.

## 2018-05-21 NOTE — TELEPHONE ENCOUNTER
----- Message from Lori Ferguson sent at 5/21/2018 11:32 AM CDT -----  Contact: Mother  Type:  RX Refill Request    Who Called:  Katia, mother  Refill or New Rx:  Refill  RX Name and Strength:  Metoprolol 50 mg  How is the patient currently taking it? (ex. 1XDay):  once a day  Is this a 30 day or 90 day RX:  30  Preferred Pharmacy with phone number:    [facility] [address] [place] [id number]  Phone: [phone] Fax: [phone]    Local or Mail Order:  Local  Ordering Provider:  Dr Reddy  [facility] Call Back Number:  [phone]  Additional Information:  Please advise. Thanks.

## 2018-06-05 RX ORDER — LAMOTRIGINE 100 MG/1
TABLET ORAL
Qty: 30 TABLET | Refills: 5 | Status: SHIPPED | OUTPATIENT
Start: 2018-06-05 | End: 2019-01-17 | Stop reason: SDUPTHER

## 2018-06-09 DIAGNOSIS — Z71.6 ENCOUNTER FOR SMOKING CESSATION COUNSELING: ICD-10-CM

## 2018-06-11 RX ORDER — BUPROPION HYDROCHLORIDE 150 MG/1
TABLET, EXTENDED RELEASE ORAL
Qty: 60 TABLET | Refills: 0 | Status: SHIPPED | OUTPATIENT
Start: 2018-06-11 | End: 2018-07-09 | Stop reason: SDUPTHER

## 2018-07-09 DIAGNOSIS — Z71.6 ENCOUNTER FOR SMOKING CESSATION COUNSELING: ICD-10-CM

## 2018-07-09 RX ORDER — BUPROPION HYDROCHLORIDE 150 MG/1
TABLET, EXTENDED RELEASE ORAL
Qty: 60 TABLET | Refills: 0 | Status: SHIPPED | OUTPATIENT
Start: 2018-07-09 | End: 2018-07-26 | Stop reason: ALTCHOICE

## 2018-07-26 ENCOUNTER — OFFICE VISIT (OUTPATIENT)
Dept: PRIMARY CARE CLINIC | Facility: CLINIC | Age: 23
End: 2018-07-26
Payer: MEDICAID

## 2018-07-26 ENCOUNTER — TELEPHONE (OUTPATIENT)
Dept: PRIMARY CARE CLINIC | Facility: CLINIC | Age: 23
End: 2018-07-26

## 2018-07-26 VITALS
HEART RATE: 74 BPM | HEIGHT: 74 IN | TEMPERATURE: 99 F | SYSTOLIC BLOOD PRESSURE: 131 MMHG | RESPIRATION RATE: 18 BRPM | DIASTOLIC BLOOD PRESSURE: 81 MMHG | WEIGHT: 222.5 LBS | BODY MASS INDEX: 28.55 KG/M2 | OXYGEN SATURATION: 98 %

## 2018-07-26 DIAGNOSIS — F41.9 ANXIETY: ICD-10-CM

## 2018-07-26 DIAGNOSIS — F31.9 BIPOLAR AFFECTIVE DISORDER, REMISSION STATUS UNSPECIFIED: ICD-10-CM

## 2018-07-26 DIAGNOSIS — Z00.00 ROUTINE PHYSICAL EXAMINATION: Primary | ICD-10-CM

## 2018-07-26 DIAGNOSIS — F33.2 SEVERE EPISODE OF RECURRENT MAJOR DEPRESSIVE DISORDER, WITHOUT PSYCHOTIC FEATURES: ICD-10-CM

## 2018-07-26 PROCEDURE — 99213 OFFICE O/P EST LOW 20 MIN: CPT | Mod: S$PBB,,, | Performed by: NURSE PRACTITIONER

## 2018-07-26 PROCEDURE — 99213 OFFICE O/P EST LOW 20 MIN: CPT | Mod: PBBFAC,PN | Performed by: NURSE PRACTITIONER

## 2018-07-26 PROCEDURE — 99999 PR PBB SHADOW E&M-EST. PATIENT-LVL III: CPT | Mod: PBBFAC,,, | Performed by: NURSE PRACTITIONER

## 2018-07-26 NOTE — PROGRESS NOTES
Chief Complaint  Chief Complaint   Patient presents with    Annual Exam       HPI  Navneet Sanabria is a 22 y.o. male with multiple medical diagnoses as listed in the medical history and problem list that presents for routine physical examination.    Patient presents requesting routine physical examination for admission to an outpatient psychiatric facility [facility].  No complaints at this time.  Blood pressure and heart rate within normal limits.  Afebrile.  Patient is a current smoker.  Denies illegal drug use.  No alcohol.  Patient up-to-date on all vaccinations.  Last Tdap 2009.    Depressive disorder-reports compliance with current dose of Lamictal.  Reports mood as usual with moderate anxiety and dysphoria..  Denies suicidal homicidal ideation at this time.            PAST MEDICAL HISTORY:  Past Medical History:   Diagnosis Date    ADHD (attention deficit hyperactivity disorder)        PAST SURGICAL HISTORY:  Past Surgical History:   Procedure Laterality Date    KIDNEY SURGERY         SOCIAL HISTORY:  Social History     Social History    Marital status: Single     Spouse name: N/A    Number of children: N/A    Years of education: N/A     Occupational History    Not on file.     Social History Main Topics    Smoking status: Current Every Day Smoker     Packs/day: 1.00     Types: Cigarettes    Smokeless tobacco: Never Used    Alcohol use No    Drug use: No    Sexual activity: Yes     Other Topics Concern    Not on file     Social History Narrative    No narrative on file       FAMILY HISTORY:  Family History   Problem Relation Age of Onset    Family history unknown: Yes       ALLERGIES AND MEDICATIONS: updated and reviewed.  Review of patient's allergies indicates:  No Known Allergies  Current Outpatient Prescriptions   Medication Sig Dispense Refill    lamoTRIgine (LAMICTAL) 100 MG tablet TAKE 1 TABLET BY MOUTH EVERY DAY 30 tablet 5    ranitidine (ZANTAC) 300 MG tablet TAKE 1  "TABLET BY MOUTH EVERY DAY 30 tablet 5     No current facility-administered medications for this visit.          ROS  Review of Systems   Constitutional: Negative for chills, fatigue and fever.   HENT: Negative for congestion, rhinorrhea, sinus pressure and sore throat.    Respiratory: Negative for cough, chest tightness and shortness of breath.    Cardiovascular: Negative for chest pain and palpitations.   Gastrointestinal: Negative for abdominal pain, blood in stool, diarrhea, nausea and vomiting.   Genitourinary: Negative for dysuria, frequency, hematuria and urgency.   Musculoskeletal: Negative for arthralgias and back pain.   Skin: Negative for rash and wound.   Neurological: Negative for dizziness and headaches.   Psychiatric/Behavioral: Positive for dysphoric mood and sleep disturbance. The patient is nervous/anxious.          PHYSICAL EXAM  Vitals:    07/26/18 1140   BP: 131/81   BP Location: Right arm   Patient Position: Sitting   BP Method: Medium (Automatic)   Pulse: 74   Resp: 18   Temp: 98.6 °F (37 °C)   TempSrc: Oral   SpO2: 98%   Weight: 100.9 kg (222 lb 8 oz)   Height: 6' 2" (1.88 m)    Body mass index is 28.57 kg/m².  Weight: 100.9 kg (222 lb 8 oz)   Height: 6' 2" (188 cm)     Physical Exam   Constitutional: He is oriented to person, place, and time. He appears well-developed and well-nourished.   HENT:   Head: Normocephalic.   Right Ear: Tympanic membrane normal.   Left Ear: Tympanic membrane normal.   Mouth/Throat: Uvula is midline, oropharynx is clear and moist and mucous membranes are normal.       Eyes: Conjunctivae are normal.   Cardiovascular: Normal rate, regular rhythm, normal heart sounds and normal pulses.    No murmur heard.  Pulses:       Radial pulses are 2+ on the right side, and 2+ on the left side.   No LE swelling noted   Pulmonary/Chest: Effort normal and breath sounds normal. He has no wheezes.   Abdominal: Soft. Bowel sounds are normal. There is no tenderness.   Musculoskeletal: " He exhibits no edema.   Lymphadenopathy:     He has no cervical adenopathy.   Neurological: He is alert and oriented to person, place, and time.   Skin: Skin is warm and dry. No rash noted.   Psychiatric: He is withdrawn. He exhibits a depressed mood.         Health Maintenance       Date Due Completion Date    Lipid Panel 1997 ---    HPV Vaccines (1 of 3 - Male 3 Dose Series) 12/16/2008 ---    TETANUS VACCINE 12/16/2015 ---    Pneumococcal PPSV23 (Medium Risk) (1) 12/16/2015 ---    Influenza Vaccine 08/01/2018 ---            Assessment & Plan    Navneet was seen today for annual exam.    Diagnoses and all orders for this visit:    Routine physical examination    Bipolar affective disorder, remission status unspecified  Continue Lamictal.  Complete referral to [facility] psychiatric.    Severe episode of recurrent major depressive disorder, without psychotic features  Previous referral to [facility] psychiatric.    Anxiety        Follow-up: Follow-up in about 6 months (around 1/26/2019).

## 2018-07-26 NOTE — TELEPHONE ENCOUNTER
"patient is trying to be seen at [facility] and they are telling the patients mom he needs a "physical' first. Quita says she will see the patient today   "

## 2018-07-26 NOTE — TELEPHONE ENCOUNTER
----- Message from Sara Bell sent at 7/25/2018  5:58 PM CDT -----  Contact: Pt mom Katia can be reached at [phone]  Mom is calling to speak with the nurse concerning getting pt an appt for physical.    Mom is calling pt is not good mental health right now please assist mom.      Thank you!

## 2018-08-06 DIAGNOSIS — Z71.6 ENCOUNTER FOR SMOKING CESSATION COUNSELING: ICD-10-CM

## 2018-08-06 RX ORDER — BUPROPION HYDROCHLORIDE 150 MG/1
TABLET, EXTENDED RELEASE ORAL
Qty: 60 TABLET | Refills: 0 | Status: SHIPPED | OUTPATIENT
Start: 2018-08-06 | End: 2018-08-17

## 2018-08-17 ENCOUNTER — OFFICE VISIT (OUTPATIENT)
Dept: PRIMARY CARE CLINIC | Facility: CLINIC | Age: 23
End: 2018-08-17
Payer: MEDICAID

## 2018-08-17 VITALS
SYSTOLIC BLOOD PRESSURE: 121 MMHG | BODY MASS INDEX: 28.98 KG/M2 | HEIGHT: 74 IN | DIASTOLIC BLOOD PRESSURE: 75 MMHG | TEMPERATURE: 98 F | HEART RATE: 96 BPM | OXYGEN SATURATION: 97 % | WEIGHT: 225.81 LBS | RESPIRATION RATE: 18 BRPM

## 2018-08-17 DIAGNOSIS — G89.29 CHRONIC MIDLINE LOW BACK PAIN WITH BILATERAL SCIATICA: Primary | ICD-10-CM

## 2018-08-17 DIAGNOSIS — M54.41 CHRONIC MIDLINE LOW BACK PAIN WITH BILATERAL SCIATICA: Primary | ICD-10-CM

## 2018-08-17 DIAGNOSIS — M54.42 CHRONIC MIDLINE LOW BACK PAIN WITH BILATERAL SCIATICA: Primary | ICD-10-CM

## 2018-08-17 PROCEDURE — 99214 OFFICE O/P EST MOD 30 MIN: CPT | Mod: PBBFAC,PN | Performed by: INTERNAL MEDICINE

## 2018-08-17 PROCEDURE — 99213 OFFICE O/P EST LOW 20 MIN: CPT | Mod: S$PBB,,, | Performed by: INTERNAL MEDICINE

## 2018-08-17 PROCEDURE — 99999 PR PBB SHADOW E&M-EST. PATIENT-LVL IV: CPT | Mod: PBBFAC,,, | Performed by: INTERNAL MEDICINE
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
## 2018-09-03 DIAGNOSIS — Z71.6 ENCOUNTER FOR SMOKING CESSATION COUNSELING: ICD-10-CM

## 2018-09-04 RX ORDER — BUPROPION HYDROCHLORIDE 150 MG/1
TABLET, EXTENDED RELEASE ORAL
Qty: 60 TABLET | Refills: 0 | Status: SHIPPED | OUTPATIENT
Start: 2018-09-04 | End: 2020-03-05

## 2019-01-17 RX ORDER — LAMOTRIGINE 100 MG/1
TABLET ORAL
Qty: 30 TABLET | Refills: 0 | Status: SHIPPED | OUTPATIENT
Start: 2019-01-17 | End: 2020-03-05

## 2019-09-23 NOTE — PROGRESS NOTES
urine culture and Gc/ Chlamydia  
no back pain, no gout, no musculoskeletal pain, no neck pain, and no weakness.

## 2020-03-05 ENCOUNTER — OFFICE VISIT (OUTPATIENT)
Dept: PRIMARY CARE CLINIC | Facility: CLINIC | Age: 25
End: 2020-03-05
Payer: MEDICAID

## 2020-03-05 VITALS
DIASTOLIC BLOOD PRESSURE: 84 MMHG | HEART RATE: 72 BPM | OXYGEN SATURATION: 98 % | HEIGHT: 74 IN | BODY MASS INDEX: 33.27 KG/M2 | TEMPERATURE: 98 F | RESPIRATION RATE: 18 BRPM | WEIGHT: 259.25 LBS | SYSTOLIC BLOOD PRESSURE: 120 MMHG

## 2020-03-05 DIAGNOSIS — M54.42 CHRONIC MIDLINE LOW BACK PAIN WITH BILATERAL SCIATICA: Primary | ICD-10-CM

## 2020-03-05 DIAGNOSIS — Z72.0 TOBACCO USE: ICD-10-CM

## 2020-03-05 DIAGNOSIS — G89.29 CHRONIC MIDLINE LOW BACK PAIN WITH BILATERAL SCIATICA: Primary | ICD-10-CM

## 2020-03-05 DIAGNOSIS — M54.41 CHRONIC MIDLINE LOW BACK PAIN WITH BILATERAL SCIATICA: Primary | ICD-10-CM

## 2020-03-05 PROCEDURE — 99214 OFFICE O/P EST MOD 30 MIN: CPT | Mod: PBBFAC,PN | Performed by: INTERNAL MEDICINE

## 2020-03-05 PROCEDURE — 99999 PR PBB SHADOW E&M-EST. PATIENT-LVL IV: ICD-10-PCS | Mod: PBBFAC,,, | Performed by: INTERNAL MEDICINE

## 2020-03-05 PROCEDURE — 99214 PR OFFICE/OUTPT VISIT, EST, LEVL IV, 30-39 MIN: ICD-10-PCS | Mod: S$PBB,,, | Performed by: INTERNAL MEDICINE

## 2020-03-05 PROCEDURE — 99999 PR PBB SHADOW E&M-EST. PATIENT-LVL IV: CPT | Mod: PBBFAC,,, | Performed by: INTERNAL MEDICINE

## 2020-03-05 PROCEDURE — 99214 OFFICE O/P EST MOD 30 MIN: CPT | Mod: S$PBB,,, | Performed by: INTERNAL MEDICINE

## 2020-03-05 RX ORDER — PROPRANOLOL HYDROCHLORIDE 20 MG/1
1 TABLET ORAL 2 TIMES DAILY
Status: ON HOLD | COMMUNITY
Start: 2020-03-02 | End: 2020-03-12 | Stop reason: HOSPADM

## 2020-03-05 RX ORDER — ARIPIPRAZOLE 2 MG/1
1 TABLET ORAL NIGHTLY
Status: ON HOLD | COMMUNITY
Start: 2020-03-02 | End: 2020-03-12 | Stop reason: HOSPADM

## 2020-03-05 NOTE — PROGRESS NOTES
Subjective:       Patient ID: Navneet Sanabria is a 24 y.o. male.    Chief Complaint: Headache and suicidal thoughts    HPI  Pt with h/o depression was in psych facility [facility] when he was 19 y.o for suicidal attempt with razor blade and has been treated by psych as OP with abilify inderal pt try to get appt to see psych but has to wait for several months he is having suicidal thoughts and ideation for a while and plan to use medication or razor blade pt also realized and want to get help he is currently working as  and live with mom at home hedenies any physical symptoms except chronic back pain and HA discuss with mom who brought pt here to bring pt to ER for further evaluation and tx  Review of Systems    Objective:      Physical Exam   Constitutional: He is oriented to person, place, and time. He appears well-developed and well-nourished. No distress.   Overweight and tall   HENT:   Head: Normocephalic and atraumatic.   Right Ear: External ear normal.   Left Ear: External ear normal.   Nose: Nose normal.   Mouth/Throat: Oropharynx is clear and moist. No oropharyngeal exudate.   Patient complained of headache   Eyes: Pupils are equal, round, and reactive to light. Conjunctivae and EOM are normal. Right eye exhibits no discharge. Left eye exhibits no discharge.   Neck: Normal range of motion. Neck supple. No thyromegaly present.   Cardiovascular: Normal rate, regular rhythm, normal heart sounds and intact distal pulses. Exam reveals no gallop and no friction rub.   No murmur heard.  Pulmonary/Chest: Effort normal and breath sounds normal. No respiratory distress. He has no wheezes. He has no rales. He exhibits no tenderness.   Abdominal: Soft. Bowel sounds are normal. He exhibits no distension. There is no tenderness. There is no rebound and no guarding.   Musculoskeletal: Normal range of motion. He exhibits tenderness (Tenderness across lower back). He exhibits no edema or deformity.   Lymphadenopathy:      He has no cervical adenopathy.   Neurological: He is alert and oriented to person, place, and time.   Skin: Skin is warm and dry. Capillary refill takes less than 2 seconds. No rash noted. No erythema.   Psychiatric: He has a normal mood and affect. Judgment and thought content normal.   Nursing note and vitals reviewed.      Assessment:       1. Chronic midline low back pain with bilateral sciatica    2. Tobacco use        Plan:       Chronic midline low back pain with bilateral sciatica    Tobacco use

## 2020-03-06 NOTE — PATIENT INSTRUCTIONS
Patient will go to the emergency room to evaluate for admission to psychiatric hospital from major depression

## 2020-03-07 PROBLEM — F32.A DEPRESSION WITH SUICIDAL IDEATION: Status: ACTIVE | Noted: 2020-03-07

## 2020-03-07 PROBLEM — F17.200 NICOTINE USE DISORDER: Status: ACTIVE | Noted: 2020-03-07

## 2020-03-07 PROBLEM — R03.0 ELEVATED BP WITHOUT DIAGNOSIS OF HYPERTENSION: Status: ACTIVE | Noted: 2020-03-07

## 2020-03-07 PROBLEM — R45.851 DEPRESSION WITH SUICIDAL IDEATION: Status: ACTIVE | Noted: 2020-03-07

## 2020-04-06 RX ORDER — MIRTAZAPINE 30 MG/1
30 TABLET, FILM COATED ORAL NIGHTLY
Qty: 30 TABLET | Refills: 0 | Status: SHIPPED | OUTPATIENT
Start: 2020-04-06 | End: 2020-05-15 | Stop reason: SDUPTHER

## 2020-04-06 RX ORDER — PROPRANOLOL HYDROCHLORIDE 10 MG/1
30 TABLET ORAL 2 TIMES DAILY
Qty: 180 TABLET | Refills: 0 | Status: SHIPPED | OUTPATIENT
Start: 2020-04-06 | End: 2020-05-15 | Stop reason: SDUPTHER

## 2020-04-06 RX ORDER — GABAPENTIN 300 MG/1
300 CAPSULE ORAL 3 TIMES DAILY
Qty: 90 CAPSULE | Refills: 0 | Status: SHIPPED | OUTPATIENT
Start: 2020-04-06 | End: 2020-05-15 | Stop reason: SDUPTHER

## 2020-04-06 RX ORDER — ARIPIPRAZOLE 5 MG/1
5 TABLET ORAL NIGHTLY
Qty: 30 TABLET | Refills: 0 | Status: SHIPPED | OUTPATIENT
Start: 2020-04-06 | End: 2020-05-15 | Stop reason: SDUPTHER

## 2020-04-06 NOTE — TELEPHONE ENCOUNTER
----- Message from Angie Garcia sent at 4/6/2020 12:26 PM CDT -----  Contact: Katia (mom) [phone]  Katia state the patient went to the [facility] and have been to see a therapist due to COVID-19. Katia state the patient is almost out of his medication. Katia is asking for a call right back if she missed the call due to her phone is ringing properly. Please call and advise.

## 2020-05-14 ENCOUNTER — OFFICE VISIT (OUTPATIENT)
Dept: PRIMARY CARE CLINIC | Facility: CLINIC | Age: 25
End: 2020-05-14
Payer: MEDICAID

## 2020-05-14 VITALS
HEART RATE: 95 BPM | SYSTOLIC BLOOD PRESSURE: 118 MMHG | HEIGHT: 74 IN | TEMPERATURE: 98 F | BODY MASS INDEX: 38.11 KG/M2 | RESPIRATION RATE: 18 BRPM | OXYGEN SATURATION: 98 % | WEIGHT: 296.94 LBS | DIASTOLIC BLOOD PRESSURE: 80 MMHG

## 2020-05-14 DIAGNOSIS — M54.42 CHRONIC MIDLINE LOW BACK PAIN WITH BILATERAL SCIATICA: ICD-10-CM

## 2020-05-14 DIAGNOSIS — F31.9 BIPOLAR AFFECTIVE DISORDER, REMISSION STATUS UNSPECIFIED: ICD-10-CM

## 2020-05-14 DIAGNOSIS — M54.9 COSTOVERTEBRAL ANGLE PAIN: ICD-10-CM

## 2020-05-14 DIAGNOSIS — G89.29 CHRONIC MIDLINE LOW BACK PAIN WITH BILATERAL SCIATICA: ICD-10-CM

## 2020-05-14 DIAGNOSIS — N23 KIDNEY PAIN: Primary | ICD-10-CM

## 2020-05-14 DIAGNOSIS — M54.41 CHRONIC MIDLINE LOW BACK PAIN WITH BILATERAL SCIATICA: ICD-10-CM

## 2020-05-14 LAB
BILIRUB SERPL-MCNC: NORMAL MG/DL
BLOOD URINE, POC: NORMAL
COLOR, POC UA: YELLOW
GLUCOSE UR QL STRIP: NORMAL
KETONES UR QL STRIP: NORMAL
LEUKOCYTE ESTERASE URINE, POC: NORMAL
NITRITE, POC UA: NORMAL
PH, POC UA: 6
PROTEIN, POC: NORMAL
SPECIFIC GRAVITY, POC UA: 1.01
UROBILINOGEN, POC UA: NORMAL

## 2020-05-14 PROCEDURE — 99214 OFFICE O/P EST MOD 30 MIN: CPT | Mod: S$PBB,,, | Performed by: INTERNAL MEDICINE

## 2020-05-14 PROCEDURE — 81002 URINALYSIS NONAUTO W/O SCOPE: CPT | Mod: PBBFAC,PN | Performed by: INTERNAL MEDICINE

## 2020-05-14 PROCEDURE — 99214 PR OFFICE/OUTPT VISIT, EST, LEVL IV, 30-39 MIN: ICD-10-PCS | Mod: S$PBB,,, | Performed by: INTERNAL MEDICINE

## 2020-05-14 PROCEDURE — 99999 PR PBB SHADOW E&M-EST. PATIENT-LVL IV: ICD-10-PCS | Mod: PBBFAC,,, | Performed by: INTERNAL MEDICINE

## 2020-05-14 PROCEDURE — 99214 OFFICE O/P EST MOD 30 MIN: CPT | Mod: PBBFAC,PN | Performed by: INTERNAL MEDICINE

## 2020-05-14 PROCEDURE — 99999 PR PBB SHADOW E&M-EST. PATIENT-LVL IV: CPT | Mod: PBBFAC,,, | Performed by: INTERNAL MEDICINE

## 2020-05-14 RX ORDER — IBUPROFEN 800 MG/1
800 TABLET ORAL 3 TIMES DAILY PRN
Qty: 30 TABLET | Refills: 1 | Status: SHIPPED | OUTPATIENT
Start: 2020-05-14 | End: 2021-10-05

## 2020-05-14 NOTE — PROGRESS NOTES
Subjective:       Patient ID: Navneet Sanabria is a 24 y.o. male.    Chief Complaint: kidney pain    HPI  patient with complaint of left CVA tenderness from more than a week feel aching inside not on the skin no skin rash no hematuria dysuria nausea vomiting fever chill short of breath or chest pain or coughing he also have chronic lower back pain has been on ibuprofen on and off for longtime had lumbar spine x-ray no specific abnormality the back pain also radiating into both of his lower extremity on and off it get worse when he a sitting try to get up or standing on all time no weakness in the lower extremity  Review of Systems    Objective:      Physical Exam   Constitutional: He is oriented to person, place, and time. He appears well-developed and well-nourished. No distress.   HENT:   Head: Normocephalic and atraumatic.   Right Ear: External ear normal.   Left Ear: External ear normal.   Nose: Nose normal.   Mouth/Throat: Oropharynx is clear and moist. No oropharyngeal exudate.   Eyes: Pupils are equal, round, and reactive to light. Conjunctivae and EOM are normal. Right eye exhibits no discharge. Left eye exhibits no discharge.   Neck: Normal range of motion. Neck supple. No thyromegaly present.   Cardiovascular: Normal rate, regular rhythm, normal heart sounds and intact distal pulses. Exam reveals no gallop and no friction rub.   No murmur heard.  Pulmonary/Chest: Effort normal and breath sounds normal. No respiratory distress. He has no wheezes. He has no rales. He exhibits no tenderness.   Abdominal: Soft. Bowel sounds are normal. He exhibits no distension. There is no tenderness. There is no rebound and no guarding.   Musculoskeletal: Normal range of motion. He exhibits no edema, tenderness (Left CVA tenderness subjectively no skin rash no swelling no erythema) or deformity.   Tenderness in the middle of lower lumbar spine with palpation   Lymphadenopathy:     He has no cervical adenopathy.   Neurological: He  is alert and oriented to person, place, and time.   Skin: Skin is warm and dry. Capillary refill takes less than 2 seconds. No rash noted. No erythema.   Psychiatric: He has a normal mood and affect. Judgment and thought content normal.   Nursing note and vitals reviewed.      Assessment:       1. Kidney pain    2. Costovertebral angle pain    3. Chronic midline low back pain with bilateral sciatica    4. Bipolar affective disorder, remission status unspecified        Plan:       Kidney pain  -     POCT URINE DIPSTICK WITHOUT MICROSCOPE  -     ibuprofen (ADVIL,MOTRIN) 800 MG tablet; Take 1 tablet (800 mg total) by mouth 3 (three) times daily as needed for Pain.  Dispense: 30 tablet; Refill: 1  -     US Abdomen Complete; Future; Expected date: 05/21/2020    Costovertebral angle pain  -     US Abdomen Complete; Future; Expected date: 05/21/2020    Chronic midline low back pain with bilateral sciatica  Comments:  Will try physical therapy if not better get MRI and consult neurosurgery  Orders:  -     Ambulatory referral/consult to Physical/Occupational Therapy; Future; Expected date: 05/21/2020    Bipolar affective disorder, remission status unspecified  Comments:  Currently managed by psychiatrist patient states he has been doing okay since on these newpsych  medication

## 2020-05-15 RX ORDER — ARIPIPRAZOLE 5 MG/1
5 TABLET ORAL NIGHTLY
Qty: 30 TABLET | Refills: 1 | Status: SHIPPED | OUTPATIENT
Start: 2020-05-15 | End: 2020-07-28 | Stop reason: SDUPTHER

## 2020-05-15 RX ORDER — PROPRANOLOL HYDROCHLORIDE 10 MG/1
30 TABLET ORAL 2 TIMES DAILY
Qty: 180 TABLET | Refills: 1 | Status: SHIPPED | OUTPATIENT
Start: 2020-05-15 | End: 2020-07-28 | Stop reason: SDUPTHER

## 2020-05-15 RX ORDER — MIRTAZAPINE 30 MG/1
30 TABLET, FILM COATED ORAL NIGHTLY
Qty: 30 TABLET | Refills: 1 | Status: SHIPPED | OUTPATIENT
Start: 2020-05-15 | End: 2020-07-28 | Stop reason: SDUPTHER

## 2020-05-15 RX ORDER — GABAPENTIN 300 MG/1
300 CAPSULE ORAL 3 TIMES DAILY
Qty: 90 CAPSULE | Refills: 1 | Status: SHIPPED | OUTPATIENT
Start: 2020-05-15 | End: 2020-07-28 | Stop reason: SDUPTHER

## 2020-05-15 NOTE — TELEPHONE ENCOUNTER
Pt. Was seen yesterday - no medication was sent to the pharmacy. Pt. Needs refills on the following medications. Please advise.

## 2020-05-15 NOTE — TELEPHONE ENCOUNTER
----- Message from Vilma Vick sent at 5/15/2020  8:09 AM CDT -----  Contact: Katia (mother) [phone]  Patient is going out of town this afternoon and need these called in now.      Requesting an RX refill or new RX.  Is this a refill or new RX:  Refill  RX name and strength: ARIPiprazole (ABILIFY) 5 MG Tab  Directions (copy/paste from chart): Take 1 tablet (5 mg total) by mouth every evening   Is this a 30 day or 90 day RX:  30  Local pharmacy or mail order pharmacy:local     Pharmacy name and phone # (copy/paste from chart):[facility]    [phone] (Phone)  [phone] (Fax)  Comments:      Requesting an RX refill or new RX.  Is this a refill or new RX:  Refill  RX name and strength: propranoloL (INDERAL) 10 MG tablet   Directions (copy/paste from chart):Take 3 tablets (30 mg total) by mouth 2 (two) times daily    Is this a 30 day or 90 day RX:  30  Local pharmacy or mail order pharmacy: local  Pharmacy name and phone # (copy/paste from chart):  [facility]  [phone] (Phone)  [phone] (Fax)  Comments:          Requesting an RX refill or new RX.  Is this a refill or new RX:  Refill  RX name and strength: mirtazapine (REMERON) 30 MG tablet   Directions (copy/paste from chart):  Take 1 tablet (30 mg total) by mouth every evening  Is this a 30 day or 90 day RX:  30  Local pharmacy or mail order pharmacy:  local  Pharmacy name and phone # (copy/paste from chart):   [facility] [phone] (Phone)  [phone] (Fax)  Comments:        Requesting an RX refill or new RX.  Is this a refill or new RX:  Refill  RX name and strength: gabapentin (NEURONTIN) 300 MG capsule   Directions (copy/paste from chart):   Take 1 capsule (300 mg total) by mouth 3 (three) times daily   Is this a 30 day or 90 day RX: 30   Local pharmacy or mail order pharmacy:  local  Pharmacy name and phone # (copy/paste from chart):[facility]    [phone] (Phone)  [phone] (Fax)  Comments:

## 2020-05-25 ENCOUNTER — CLINICAL SUPPORT (OUTPATIENT)
Dept: REHABILITATION | Facility: HOSPITAL | Age: 25
End: 2020-05-25
Attending: INTERNAL MEDICINE
Payer: MEDICAID

## 2020-05-25 DIAGNOSIS — R25.9 DYSFUNCTIONAL MOVEMENTS: ICD-10-CM

## 2020-05-25 DIAGNOSIS — M54.42 CHRONIC MIDLINE LOW BACK PAIN WITH BILATERAL SCIATICA: ICD-10-CM

## 2020-05-25 DIAGNOSIS — G89.29 CHRONIC MIDLINE LOW BACK PAIN WITH BILATERAL SCIATICA: ICD-10-CM

## 2020-05-25 DIAGNOSIS — Z74.09 IMPAIRED FUNCTIONAL MOBILITY AND ACTIVITY TOLERANCE: ICD-10-CM

## 2020-05-25 DIAGNOSIS — M54.41 CHRONIC MIDLINE LOW BACK PAIN WITH BILATERAL SCIATICA: ICD-10-CM

## 2020-05-25 DIAGNOSIS — M62.9 HAMSTRING TIGHTNESS OF BOTH LOWER EXTREMITIES: ICD-10-CM

## 2020-05-25 DIAGNOSIS — M53.86 DECREASED ROM OF LUMBAR SPINE: ICD-10-CM

## 2020-05-25 PROCEDURE — 97161 PT EVAL LOW COMPLEX 20 MIN: CPT | Mod: PO | Performed by: PHYSICAL THERAPIST

## 2020-05-25 NOTE — PROGRESS NOTES
[facility]  Physical Therapy Initial Evaluation    Name: Navneet Sanabria  Clinic Number: [id number]    Therapy Diagnosis:   Encounter Diagnoses   Name Primary?    Chronic midline low back pain with bilateral sciatica     Decreased ROM of lumbar spine     Hamstring tightness of both lower extremities     Impaired functional mobility and activity tolerance     Dysfunctional movements      Physician: Jose Manuel Alejandre MD    Physician Orders: PT Eval and Treat back   Medical Diagnosis from Referral:   M54.41,M54.42,G89.29 (ICD-10-CM) - Chronic midline low back pain with bilateral sciatica  Evaluation Date: 5/25/2020  Authorization Period Expiration: 12/31/2020  Plan of Care Expiration: 8/25/2020  Visit # / Visits authorized: 1/ 30    Time In: 1047  Time Out: 1135  Total Billable Time: 48 minutes    Precautions: Standard    Subjective   Medical History:   Past Medical History:   Diagnosis Date    ADHD (attention deficit hyperactivity disorder)     Bipolar 1 disorder     Depression        Surgical History:   Navneet Sanabria  has a past surgical history that includes Kidney surgery.    Medications:   Navneet has a current medication list which includes the following prescription(s): aripiprazole, gabapentin, ibuprofen, mirtazapine, and propranolol.    Allergies:   Review of patient's allergies indicates:  No Known Allergies     History of current condition - Navneet reports: intermittent pain low back.   Date of onset: one month ago. Helping friend lift furniture and lifted a chair. Pain developed later in the day. Gradual progression of pain. Says he coughed and it exacerbated the pain. Presently improved.   Past history of back problems since 15 or 17 yo. Began with doing physical labor and while in [employer]. Events were episodic and self-limiting .  Pain:  Current 0/10, worst 8/10, best 0/10   Location: bilateral low back, centrally   Description: Aching  Aggravating Factors: Sitting > 10-20 minutes    Easing Factors: stretching and moving, toe touching and walking    Sleep - not disturbed.   Cough / sneeze / strain - cough   B/B function - negative    Current Level of Function:   Personal - no limitation   Domestic - activities involving bending as laundry and making my bed, tightens up the back.   Community / social - no limitation   Occupation - no limitation   Sports/recreation/fitness - does not participate.     Prior Level of Function: not limited   Prior Therapy: no   Social History: single, lives in a single family home with mother.   Occupation: cook at [employer].     Pts goals: no more back pain and would like to loose weight.   Imaging, none     Objective     Posture Alignment: left lateral shift,   Sensation: Light Touch: Intact  Palpation: negative for spasm, guarding. Tender over the sacral sulcus     Selective Functional Movement Assessment:  FN: functional, non-painful  FP: functional, painful  DP: dysfunctional, painful  DN: dysfunctional, non-painful  Multi-Segmental Flexion:     see below   Multi-Segmental Extension: see below   Multi-Segmental Rotation:   Right: see below   Left:   see below       Lumbar/Thoracic AROM: Pain/Dysfunction with Movement:   Flexion                         75/40 = 35 DP- sacral angle < 70, no uniform curve, no TT, sacral angle < 70, excess effort    Extension                     40/25 = 15 DN-spine of scapula does not pass heels, knees flexed, excess effort.    Right side bending                40  N   Left side bending                   40 N   Right rotation FN   Left rotation FN       LOWER EXTREMITY STRENGTH:   Left  5/5 Right  5/5       DTR's:   Left Right   Patella Tendon 2+ 2+   Achilles Tendon 2+ 2+           FUNCTIONAL MOVEMENT BREAKOUTS:  Long sitting  = DP  SLR   -Active   R - DN 50  L -  DN 50  -Passive  R - DN 65  L - DN 65  Knees to chest   Active - FN  Prone rocking Test - DN  No uniform curve in the lumbar spine        FLEXIBILITY  Hamstrings  moderate limitation BLE  Piriformis  Minimal limitation      BLE  Back extensors      moderate tightness       Special Tests   Left Right   SLR Neg   Neg       Lasegue Neg  Neg    Cooper Neg  Neg      Slump Test  Slouched sit w/leg extended- increased pain   - Erect sit - pain decreased   Erect sit w/leg extended - no pain  - Slouched sit - pain increased     GAIT: ambulates with no assistive device with independently.     GAIT DEVIATIONS: displays no deviations       Education provided:   - instructed in erect sitting postures and hamstring stretching      CMS Impairment/Limitation/Restriction for FOTO back  Survey - not performed             Assessment         Navneet is a 24 y.o. male referred to outpatient Physical Therapy with a medical diagnosis of Chronic midline low back pain with bilateral sciatica.  Pt presents with clinical findings of a posterior chain mobility dysfunction, spine flexion mobility dysfunction and spine extension mobility dysfunction. There is associated weakness of the trunk and core musculature. Tension sign evident with Slump Sit testing.      Evaluation has determined a decrease in functional status and subjective and objective deficits that can be addressed by physical therapy intervention. Pt demonstrates pain limiting some functional activities. Decreased flexibility and strength limiting normal movement patterns.   Decreased postural strength and awareness. Positive special testing. Decreased participation in functional and recreational activities. Subjective and objective measures are addressed by goals in the plan of care. Patient/family are involved in the development of these goals. Patient/family are educated about current injury and treatment.    .Current impairments limits patient with all functional activities.   Pt prognosis is Good.   Pt will benefit from skilled outpatient Physical Therapy to address the deficits stated above and in the chart below, provide pt/family  education, and to maximize pt's level of independence.     Plan of care discussed with patient: Yes  Pt's spiritual, cultural and educational needs considered and patient is agreeable to the plan of care and goals as stated below:       Anticipated Barriers for therapy: depressive disorder    Medical Necessity is demonstrated by the following  History  Co-morbidities and personal factors that may impact the plan of care Co-morbidities:   depression and bi-polar disorder    Personal Factors:   coping style  lifestyle     low   Examination  Body Structures and Functions, activity limitations and participation restrictions that may impact the plan of care Body Regions:   back  lower extremities    Body Systems:    musculoskeletal / mobility assissment     Participation Restrictions:   None     Activity limitations:   Learning and applying knowledge  no deficits    General Tasks and Commands  no deficits    Communication  no deficits    Mobility  lifting and carrying objects  bending     Self care  no deficits    Domestic Life  doing house work (cleaning house, washing dishes, laundry)  activities that require bending     Interactions/Relationships  no deficits    Life Areas  no deficits    Community and Social Life  recreation and leisure         low   Clinical Presentation stable and uncomplicated low   Decision Making/ Complexity Score: low           Short Term GOALS: 5 weeks. Pt agrees with goals set.  1. Pts pain intensity decreased 20-40% for improved quality of life and functional mobilty  2. Pt to demonstrate core activation with spinal stability during transitional movements for improved quality of movement  3. Pt to demonstrate ability to sit with 20 to 40% reduced pain for comfortable sitting for longer periods   4. Patient demonstrates independence with HEP for self management   5/ Patient demonstrates improved spine flexion for a functional prone rocking and improved quality of movement     Long Term GOALS:  10 weeks. Pt agrees with goals set.  1. Patient demonstrates improved quality of spine flexion and extension mobility with straight knees to enhance functional mobility   2. Patient demonstrates increased active SLR to 70 degrees to improve functional mobility    3. Patient demonstrates improved overall function and return demonstration to functional ADL and recreational lifestyle  4. Pt demonstrates independence with body mechanics to control episodes of pain associated with daily ADL and improve functional lifestyle.     5.Patient demonstrates independence with Postural Awareness for control of pain         Plan       Plan of care Certification: 5/25/2020 to 8/25/2020.    Outpatient Physical Therapy 2 times weekly for 10 weeks to include the following interventions: Manual Therapy, Patient Education and Therapeutic Exercise.     Raffy Gonzáles, JOSE      Therapist: Raffy Gonzáles, JOSE

## 2020-05-25 NOTE — PLAN OF CARE
[facility]  Physical Therapy Initial Evaluation    Name: Navneet Sanabria  Clinic Number: [id number]    Therapy Diagnosis:   Encounter Diagnoses   Name Primary?    Chronic midline low back pain with bilateral sciatica     Decreased ROM of lumbar spine     Hamstring tightness of both lower extremities     Impaired functional mobility and activity tolerance     Dysfunctional movements      Physician: Jose Manuel Alejandre MD    Physician Orders: PT Eval and Treat back   Medical Diagnosis from Referral:   M54.41,M54.42,G89.29 (ICD-10-CM) - Chronic midline low back pain with bilateral sciatica  Evaluation Date: 5/25/2020  Authorization Period Expiration: 12/31/2020  Plan of Care Expiration: 8/25/2020  Visit # / Visits authorized: 1/ 30    Time In: 1047  Time Out: 1135  Total Billable Time: 48 minutes    Precautions: Standard    Subjective   Medical History:   Past Medical History:   Diagnosis Date    ADHD (attention deficit hyperactivity disorder)     Bipolar 1 disorder     Depression        Surgical History:   Navneet Sanabria  has a past surgical history that includes Kidney surgery.    Medications:   Navneet has a current medication list which includes the following prescription(s): aripiprazole, gabapentin, ibuprofen, mirtazapine, and propranolol.    Allergies:   Review of patient's allergies indicates:  No Known Allergies     History of current condition - Navneet reports: intermittent pain low back.   Date of onset: one month ago. Helping friend lift furniture and lifted a chair. Pain developed later in the day. Gradual progression of pain. Says he coughed and it exacerbated the pain. Presently improved.   Past history of back problems since 15 or 17 yo. Began with doing physical labor and while in [employer]. Events were episodic and self-limiting .  Pain:  Current 0/10, worst 8/10, best 0/10   Location: bilateral low back, centrally   Description: Aching  Aggravating Factors: Sitting > 10-20 minutes    Easing Factors: stretching and moving, toe touching and walking    Sleep - not disturbed.   Cough / sneeze / strain - cough   B/B function - negative    Current Level of Function:   Personal - no limitation   Domestic - activities involving bending as laundry and making my bed, tightens up the back.   Community / social - no limitation   Occupation - no limitation   Sports/recreation/fitness - does not participate.     Prior Level of Function: not limited   Prior Therapy: no   Social History: single, lives in a single family home with mother.   Occupation: cook at [employer].     Pts goals: no more back pain and would like to loose weight.   Imaging, none     Objective     Posture Alignment: left lateral shift,   Sensation: Light Touch: Intact  Palpation: negative for spasm, guarding. Tender over the sacral sulcus     Selective Functional Movement Assessment:  FN: functional, non-painful  FP: functional, painful  DP: dysfunctional, painful  DN: dysfunctional, non-painful  Multi-Segmental Flexion:     see below   Multi-Segmental Extension: see below   Multi-Segmental Rotation:   Right: see below   Left:   see below       Lumbar/Thoracic AROM: Pain/Dysfunction with Movement:   Flexion                         75/40 = 35 DP- sacral angle < 70, no uniform curve, no TT, sacral angle < 70, excess effort    Extension                     40/25 = 15 DN-spine of scapula does not pass heels, knees flexed, excess effort.    Right side bending                40  N   Left side bending                   40 N   Right rotation FN   Left rotation FN       LOWER EXTREMITY STRENGTH:   Left  5/5 Right  5/5       DTR's:   Left Right   Patella Tendon 2+ 2+   Achilles Tendon 2+ 2+           FUNCTIONAL MOVEMENT BREAKOUTS:  Long sitting  = DP  SLR   -Active   R - DN 50  L -  DN 50  -Passive  R - DN 65  L - DN 65  Knees to chest   Active - FN  Prone rocking Test - DN  No uniform curve in the lumbar spine        FLEXIBILITY  Hamstrings  moderate limitation BLE  Piriformis  Minimal limitation      BLE  Back extensors      moderate tightness       Special Tests   Left Right   SLR Neg   Neg       Lasegue Neg  Neg    Cooper Neg  Neg      Slump Test  Slouched sit w/leg extended- increased pain   - Erect sit - pain decreased   Erect sit w/leg extended - no pain  - Slouched sit - pain increased     GAIT: ambulates with no assistive device with independently.     GAIT DEVIATIONS: displays no deviations       Education provided:   - instructed in erect sitting postures and hamstring stretching      CMS Impairment/Limitation/Restriction for FOTO back  Survey - not performed             Assessment         Navneet is a 24 y.o. male referred to outpatient Physical Therapy with a medical diagnosis of Chronic midline low back pain with bilateral sciatica.  Pt presents with clinical findings of a posterior chain mobility dysfunction, spine flexion mobility dysfunction and spine extension mobility dysfunction. There is associated weakness of the trunk and core musculature. Tension sign evident with Slump Sit testing.      Evaluation has determined a decrease in functional status and subjective and objective deficits that can be addressed by physical therapy intervention. Pt demonstrates pain limiting some functional activities. Decreased flexibility and strength limiting normal movement patterns.   Decreased postural strength and awareness. Positive special testing. Decreased participation in functional and recreational activities. Subjective and objective measures are addressed by goals in the plan of care. Patient/family are involved in the development of these goals. Patient/family are educated about current injury and treatment.    .Current impairments limits patient with all functional activities.   Pt prognosis is Good.   Pt will benefit from skilled outpatient Physical Therapy to address the deficits stated above and in the chart below, provide pt/family  education, and to maximize pt's level of independence.     Plan of care discussed with patient: Yes  Pt's spiritual, cultural and educational needs considered and patient is agreeable to the plan of care and goals as stated below:       Anticipated Barriers for therapy: depressive disorder    Medical Necessity is demonstrated by the following  History  Co-morbidities and personal factors that may impact the plan of care Co-morbidities:   depression and bi-polar disorder    Personal Factors:   coping style  lifestyle     low   Examination  Body Structures and Functions, activity limitations and participation restrictions that may impact the plan of care Body Regions:   back  lower extremities    Body Systems:    musculoskeletal / mobility assissment     Participation Restrictions:   None     Activity limitations:   Learning and applying knowledge  no deficits    General Tasks and Commands  no deficits    Communication  no deficits    Mobility  lifting and carrying objects  bending     Self care  no deficits    Domestic Life  doing house work (cleaning house, washing dishes, laundry)  activities that require bending     Interactions/Relationships  no deficits    Life Areas  no deficits    Community and Social Life  recreation and leisure         low   Clinical Presentation stable and uncomplicated low   Decision Making/ Complexity Score: low           Short Term GOALS: 5 weeks. Pt agrees with goals set.  1. Pts pain intensity decreased 20-40% for improved quality of life and functional mobilty  2. Pt to demonstrate core activation with spinal stability during transitional movements for improved quality of movement  3. Pt to demonstrate ability to sit with 20 to 40% reduced pain for comfortable sitting for longer periods   4. Patient demonstrates independence with HEP for self management   5/ Patient demonstrates improved spine flexion for a functional prone rocking and improved quality of movement     Long Term GOALS:  10 weeks. Pt agrees with goals set.  1. Patient demonstrates improved quality of spine flexion and extension mobility with straight knees to enhance functional mobility   2. Patient demonstrates increased active SLR to 70 degrees to improve functional mobility    3. Patient demonstrates improved overall function and return demonstration to functional ADL and recreational lifestyle  4. Pt demonstrates independence with body mechanics to control episodes of pain associated with daily ADL and improve functional lifestyle.     5.Patient demonstrates independence with Postural Awareness for control of pain         Plan       Plan of care Certification: 5/25/2020 to 8/25/2020.    Outpatient Physical Therapy 2 times weekly for 10 weeks to include the following interventions: Manual Therapy, Patient Education and Therapeutic Exercise.     Raffy Gonzáles, JOSE      Therapist: Raffy Gonzáles, JOSE

## 2020-06-01 ENCOUNTER — DOCUMENTATION ONLY (OUTPATIENT)
Dept: REHABILITATION | Facility: HOSPITAL | Age: 25
End: 2020-06-01

## 2020-06-01 NOTE — PROGRESS NOTES
Documentation Only/ No Show      Patient: Navneet Sanabria  Date of Session: 6/1/2020  Diagnosis: No diagnosis found.  MRN: [id number]  Navneet Sanabria did not attend his scheduled therapy appointment today and did not call to cancel nor reschedule. Physical therapy will follow up with patient at their next scheduled visit. No charges have been posted today.

## 2020-07-16 ENCOUNTER — TELEPHONE (OUTPATIENT)
Dept: PRIMARY CARE CLINIC | Facility: CLINIC | Age: 25
End: 2020-07-16

## 2020-07-16 NOTE — TELEPHONE ENCOUNTER
----- Message from Neris Bustos sent at 7/16/2020  2:51 PM CDT -----  Is this a refill or new RX:  Refill    RX name and strength: ARIPiprazole (ABILIFY) 5 MG Tab, gabapentin (NEURONTIN) 300 MG capsule, mirtazapine (REMERON) 30 MG tablet, and propranoloL (INDERAL) 10 MG tablet  :    Pharmacy name and phone # [id number] - [address] AT [facility] [phone] (Phone)  [phone] (Fax)

## 2020-07-16 NOTE — TELEPHONE ENCOUNTER
----- Message from Neris Bustos sent at 7/16/2020  2:51 PM CDT -----  Is this a refill or new RX:  Refill    RX name and strength: ARIPiprazole (ABILIFY) 5 MG Tab, gabapentin (NEURONTIN) 300 MG capsule, mirtazapine (REMERON) 30 MG tablet, and propranoloL (INDERAL) 10 MG tablet  :    Pharmacy name and phone # [facility] #[id number] - [address] AT [facility] [phone] (Phone)  [phone] (Fax)

## 2020-07-16 NOTE — TELEPHONE ENCOUNTER
Called pt. States he will call back to schedule a Audio visit with Dr. Alejandre on Monday 7/20 to discuss medication refills on the following medications :     Abilify 5 mg  Gabapentin 300 mg  Remeron 30 mg   Inderal 10 mg

## 2020-07-28 RX ORDER — ARIPIPRAZOLE 5 MG/1
5 TABLET ORAL NIGHTLY
Qty: 30 TABLET | Refills: 0 | Status: SHIPPED | OUTPATIENT
Start: 2020-07-28 | End: 2020-08-10 | Stop reason: SDUPTHER

## 2020-07-28 RX ORDER — GABAPENTIN 300 MG/1
300 CAPSULE ORAL 3 TIMES DAILY
Qty: 20 CAPSULE | Refills: 0 | Status: SHIPPED | OUTPATIENT
Start: 2020-07-28 | End: 2020-08-10 | Stop reason: SDUPTHER

## 2020-07-28 RX ORDER — PROPRANOLOL HYDROCHLORIDE 10 MG/1
30 TABLET ORAL 2 TIMES DAILY
Qty: 40 TABLET | Refills: 0 | Status: SHIPPED | OUTPATIENT
Start: 2020-07-28 | End: 2020-08-10 | Stop reason: SDUPTHER

## 2020-07-28 RX ORDER — MIRTAZAPINE 30 MG/1
30 TABLET, FILM COATED ORAL NIGHTLY
Qty: 30 TABLET | Refills: 0 | Status: SHIPPED | OUTPATIENT
Start: 2020-07-28 | End: 2020-08-10 | Stop reason: SDUPTHER

## 2020-07-28 NOTE — TELEPHONE ENCOUNTER
----- Message from Lori Ferguson sent at 7/28/2020  2:10 PM CDT -----  Contact: Patient  Type:  Patient Returning Call    Who Called:  Navneet, patient  Who Left Message for Patient:  Yahaira  Does the patient know what this is regarding?:  Virtual appointment  Best Call Back Number:  [phone]  Additional Information:  Missed your call, please call him back. Thanks.

## 2020-07-28 NOTE — TELEPHONE ENCOUNTER
Pt has upcoming appointment with you on 8/10. Requesting refill on following medications until he can be seen.

## 2020-08-10 ENCOUNTER — OFFICE VISIT (OUTPATIENT)
Dept: PRIMARY CARE CLINIC | Facility: CLINIC | Age: 25
End: 2020-08-10
Payer: MEDICAID

## 2020-08-10 VITALS
HEART RATE: 91 BPM | BODY MASS INDEX: 34.01 KG/M2 | HEIGHT: 74 IN | SYSTOLIC BLOOD PRESSURE: 116 MMHG | TEMPERATURE: 99 F | DIASTOLIC BLOOD PRESSURE: 62 MMHG | RESPIRATION RATE: 20 BRPM | WEIGHT: 265 LBS | OXYGEN SATURATION: 99 %

## 2020-08-10 DIAGNOSIS — F31.9 BIPOLAR AFFECTIVE DISORDER, REMISSION STATUS UNSPECIFIED: Primary | ICD-10-CM

## 2020-08-10 DIAGNOSIS — E66.3 OVERWEIGHT: ICD-10-CM

## 2020-08-10 PROCEDURE — 99213 PR OFFICE/OUTPT VISIT, EST, LEVL III, 20-29 MIN: ICD-10-PCS | Mod: S$PBB,,, | Performed by: INTERNAL MEDICINE

## 2020-08-10 PROCEDURE — 99999 PR PBB SHADOW E&M-EST. PATIENT-LVL IV: ICD-10-PCS | Mod: PBBFAC,,, | Performed by: INTERNAL MEDICINE

## 2020-08-10 PROCEDURE — 99214 OFFICE O/P EST MOD 30 MIN: CPT | Mod: PBBFAC,PN | Performed by: INTERNAL MEDICINE

## 2020-08-10 PROCEDURE — 99213 OFFICE O/P EST LOW 20 MIN: CPT | Mod: S$PBB,,, | Performed by: INTERNAL MEDICINE

## 2020-08-10 PROCEDURE — 99999 PR PBB SHADOW E&M-EST. PATIENT-LVL IV: CPT | Mod: PBBFAC,,, | Performed by: INTERNAL MEDICINE

## 2020-08-10 RX ORDER — PROPRANOLOL HYDROCHLORIDE 10 MG/1
30 TABLET ORAL 2 TIMES DAILY
Qty: 60 TABLET | Refills: 2 | Status: SHIPPED | OUTPATIENT
Start: 2020-08-10 | End: 2021-10-05

## 2020-08-10 RX ORDER — ARIPIPRAZOLE 5 MG/1
5 TABLET ORAL NIGHTLY
Qty: 30 TABLET | Refills: 2 | Status: SHIPPED | OUTPATIENT
Start: 2020-08-10 | End: 2020-09-09

## 2020-08-10 RX ORDER — GABAPENTIN 300 MG/1
300 CAPSULE ORAL 3 TIMES DAILY
Qty: 90 CAPSULE | Refills: 2 | Status: SHIPPED | OUTPATIENT
Start: 2020-08-10 | End: 2021-10-05

## 2020-08-10 RX ORDER — MIRTAZAPINE 30 MG/1
30 TABLET, FILM COATED ORAL NIGHTLY
Qty: 30 TABLET | Refills: 2 | Status: SHIPPED | OUTPATIENT
Start: 2020-08-10 | End: 2021-10-05

## 2020-08-10 NOTE — PROGRESS NOTES
Subjective:       Patient ID: Navneet Sanabria is a 24 y.o. male.    Chief Complaint: Medication Refill    HPI   Pt with bipolar disorder on psych meds but has not been seen by [sych several mos since [facility] pt need new psychiatrist and refill meds also want refill ADHD meds that he on before . Will get pt [facility] for evaluation of ADHD  And treaments  Review of Systems    Objective:      Physical Exam  Vitals signs and nursing note reviewed.   Constitutional:       General: He is not in acute distress.     Appearance: He is well-developed. He is obese.   HENT:      Head: Normocephalic and atraumatic.      Right Ear: External ear normal.      Left Ear: External ear normal.      Nose: Nose normal.      Mouth/Throat:      Pharynx: No oropharyngeal exudate.   Eyes:      General:         Right eye: No discharge.         Left eye: No discharge.      Conjunctiva/sclera: Conjunctivae normal.      Pupils: Pupils are equal, round, and reactive to light.   Neck:      Musculoskeletal: Normal range of motion and neck supple.      Thyroid: No thyromegaly.   Cardiovascular:      Rate and Rhythm: Normal rate and regular rhythm.      Heart sounds: Normal heart sounds. No murmur. No friction rub. No gallop.    Pulmonary:      Effort: Pulmonary effort is normal. No respiratory distress.      Breath sounds: Normal breath sounds. No wheezing or rales.   Chest:      Chest wall: No tenderness.   Abdominal:      General: Bowel sounds are normal. There is no distension.      Palpations: Abdomen is soft.      Tenderness: There is no abdominal tenderness. There is no guarding or rebound.   Musculoskeletal: Normal range of motion.         General: No tenderness or deformity.   Lymphadenopathy:      Cervical: No cervical adenopathy.   Skin:     General: Skin is warm and dry.      Capillary Refill: Capillary refill takes less than 2 seconds.      Findings: No erythema or rash.   Neurological:      Mental Status: He is alert and oriented  to person, place, and time.   Psychiatric:         Thought Content: Thought content normal.         Judgment: Judgment normal.         Assessment:       1. Bipolar affective disorder, remission status unspecified    2. Overweight        Plan:       Bipolar affective disorder, remission status unspecified  -     Ambulatory referral/consult to Psychology; Future; Expected date: 08/17/2020  -     ARIPiprazole (ABILIFY) 5 MG Tab; Take 1 tablet (5 mg total) by mouth every evening.  Dispense: 30 tablet; Refill: 2  -     gabapentin (NEURONTIN) 300 MG capsule; Take 1 capsule (300 mg total) by mouth 3 (three) times daily.  Dispense: 90 capsule; Refill: 2  -     mirtazapine (REMERON) 30 MG tablet; Take 1 tablet (30 mg total) by mouth every evening.  Dispense: 30 tablet; Refill: 2  -     propranoloL (INDERAL) 10 MG tablet; Take 3 tablets (30 mg total) by mouth 2 (two) times daily.  Dispense: 60 tablet; Refill: 2    Overweight  Comments:  need to loose wt with diet excersing

## 2020-10-14 ENCOUNTER — OFFICE VISIT (OUTPATIENT)
Dept: PRIMARY CARE CLINIC | Facility: CLINIC | Age: 25
End: 2020-10-14
Payer: MEDICAID

## 2020-10-14 VITALS
HEART RATE: 100 BPM | OXYGEN SATURATION: 95 % | DIASTOLIC BLOOD PRESSURE: 80 MMHG | HEIGHT: 74 IN | TEMPERATURE: 99 F | SYSTOLIC BLOOD PRESSURE: 122 MMHG | WEIGHT: 304.19 LBS | RESPIRATION RATE: 18 BRPM | BODY MASS INDEX: 39.04 KG/M2

## 2020-10-14 DIAGNOSIS — K13.70 ORAL LESION: Primary | ICD-10-CM

## 2020-10-14 DIAGNOSIS — H61.20 CERUMEN IN AUDITORY CANAL ON EXAMINATION: ICD-10-CM

## 2020-10-14 DIAGNOSIS — Z23 NEED FOR VACCINATION: ICD-10-CM

## 2020-10-14 DIAGNOSIS — F17.200 CURRENT EVERY DAY SMOKER: ICD-10-CM

## 2020-10-14 PROCEDURE — 99999 PR PBB SHADOW E&M-EST. PATIENT-LVL IV: CPT | Mod: PBBFAC,,, | Performed by: STUDENT IN AN ORGANIZED HEALTH CARE EDUCATION/TRAINING PROGRAM

## 2020-10-14 PROCEDURE — 99214 OFFICE O/P EST MOD 30 MIN: CPT | Mod: PBBFAC,PN,25 | Performed by: STUDENT IN AN ORGANIZED HEALTH CARE EDUCATION/TRAINING PROGRAM

## 2020-10-14 PROCEDURE — 99214 PR OFFICE/OUTPT VISIT, EST, LEVL IV, 30-39 MIN: ICD-10-PCS | Mod: S$PBB,,, | Performed by: STUDENT IN AN ORGANIZED HEALTH CARE EDUCATION/TRAINING PROGRAM

## 2020-10-14 PROCEDURE — 99999 PR PBB SHADOW E&M-EST. PATIENT-LVL IV: ICD-10-PCS | Mod: PBBFAC,,, | Performed by: STUDENT IN AN ORGANIZED HEALTH CARE EDUCATION/TRAINING PROGRAM

## 2020-10-14 PROCEDURE — 90714 TD VACC NO PRESV 7 YRS+ IM: CPT | Mod: PBBFAC,PN

## 2020-10-14 PROCEDURE — 99214 OFFICE O/P EST MOD 30 MIN: CPT | Mod: S$PBB,,, | Performed by: STUDENT IN AN ORGANIZED HEALTH CARE EDUCATION/TRAINING PROGRAM

## 2020-10-14 PROCEDURE — 90686 IIV4 VACC NO PRSV 0.5 ML IM: CPT | Mod: PBBFAC,PN

## 2020-10-14 PROCEDURE — 90472 IMMUNIZATION ADMIN EACH ADD: CPT | Mod: PBBFAC,PN

## 2020-10-14 RX ORDER — ARIPIPRAZOLE 10 MG/1
10 TABLET ORAL EVERY MORNING
COMMUNITY
Start: 2020-09-23 | End: 2021-10-05

## 2020-10-14 RX ORDER — LISDEXAMFETAMINE DIMESYLATE 30 MG/1
30 CAPSULE ORAL DAILY
COMMUNITY
Start: 2020-09-23 | End: 2021-10-05

## 2020-10-14 RX ORDER — BUSPIRONE HYDROCHLORIDE 15 MG/1
15 TABLET ORAL 2 TIMES DAILY
Status: ON HOLD | COMMUNITY
Start: 2020-09-23 | End: 2021-10-12 | Stop reason: HOSPADM

## 2020-10-14 NOTE — PATIENT INSTRUCTIONS
Mouth and Throat Tumors  Finding out you have a tumor is scary. You may wonder what effect it will have on your life. As you and your doctors decide on your treatment, some of your concerns will be resolved. And moving forward, your healthcare team can help you learn ways to help yourself.  What is a tumor?  A tumor is a mass of abnormal cells. It is either benign (slow growing, not cancerous) or malignant (fast growing, cancerous). Some tumors, especially cancerous ones, can be life-threatening. But most tumors can be treated.  Risk factors for a cancerous tumor  You are more likely to get a tumor of the mouth or throat if you:  · Smoke cigarettes, pipes, or cigars  · Use chewing tobacco or snuff  · Drink alcohol  · Take poor care of your teeth  · Are exposed to certain industrial chemicals  · Had a mouth or throat tumor in the past  · Have a human papillomavirus (HPV) infection  Signs and symptoms of a tumor in the mouth  If you have a mouth tumor, you or your doctor may have noticed one or more of the following:  · White or red patches on tissues or gums  · Pain that doesnt go away  · A sore that doesnt heal in a week or two  · Bleeding that doesnt stop after a few days  · A swelling or lump that doesnt go away  · Problems with your teeth, dentures, or chewing  Signs and symptoms of a tumor in the throat  If you have a throat tumor, you or your doctor may have noticed one or more of the following:  · Hoarseness that doesnt go away  · Trouble swallowing  · A lump in your neck  · Pain that doesnt go away  · Aching, pain, or pressure in your ear  · Persistent coughing with or without bloody sputum  Date Last Reviewed: 11/1/2016  © 0496-7765 [employer]. [address] [place] [address], [place] [address]. All rights reserved. This information is not intended as a substitute for professional medical care. Always follow your healthcare professional's instructions.        Earwax Removal    The ear canal  makes earwax from the canals lining. The ears make wax to lubricate and protect the ear canal. The ear canal is the tube that connects the middle ear to the outside of the ear. The wax protects the ear from bacteria, infection, and damage from water or trauma.  The wax that forms in the canal naturally moves toward the outside of the ear and falls out. In some cases, the ear may make too much wax. If the wax causes problems or keeps the healthcare provider from seeing into the ear, the extra wax may be removed.  Too much wax can affect your hearing. It can cause itching. In rare cases, it can be painful. Earwax should not be removed unless it is causing a problem. You should not stick objects into your ear to remove wax unless told to do so by your healthcare provider.  Healthcare providers can remove earwax safely. It is important to stay still during the procedure to avoid damage to the ear canal. But removing earwax generally doesnt hurt. You will not usually need anesthesia or pain medicine when the provider removes the earwax.  A number of conditions lead to earwax buildup. These include some skin problems, a narrow ear canal, or ears that make too much earwax. Using cotton swabs in the canal pushes earwax deeper into the ear and contributes to the buildup of earwax.  Home care  · The healthcare provider may recommend mineral oil or an over-the-counter eardrop to use at home to soften the earwax. Use these products only if the provider recommends them. Use these products only if the provider recommends them. Carefully follow the instructions given.  · Dont use mineral oil or OTC eardrops if you might have an ear infection or a ruptured eardrum. Tell your healthcare provider right away if you have diabetes or an immune disorder.  · Dont use cotton swabs in your ears. Cotton swabs may push wax deeper into the ear canal or damage the eardrum. Use cotton gauze or a wet washcloth  to gently remove wax on the  outside of the ear and around the opening to the ear canal.  · Don't use any probing device or object such as cotton-tipped swabs or deborah pins to clean the inside of your ears.  · Dont use ear candles to clean your ears. Candling can be dangerous. It can burn the ear canal. It can also make the condition worse instead of better.  · Dont use cold water to rinse the ear. This will make you dizzy. If your provider tells you to rinse your ear, use only warm water or follow his or her instructions.  · Check the ear for signs of infection or irritation listed below under When to seek medical advice.  Steps for using eardrops  1. Warm the medicine bottle by rubbing it between your hands for a few minutes.  2. Lie down on your side, with the affected ear up.  3. Place the recommended number of drops in the ear. Wet a cotton ball with the medicine. Gently put the cotton ball into the ear opening.  Follow-up care  Follow up with your healthcare provider, or as directed.  When to seek medical advice  Call the provider right away if you have:  · Ear pain that gets worse  · Fever of 100.4F°F (38°C) or higher, or as directed by your healthcare provider  · Worsening wax buildup  · Severe pain, dizziness, or nausea  · Bleeding from the ear  · Hearing problems  · Signs of irritation from the eardrops, such as burning, stinging, or swelling and tenderness  · Foul-smelling fluid draining from the ear  · Swelling, redness, or tenderness of the outer ear  · Headache, neck pain, or stiff neck  Date Last Reviewed: 3/22/2015  © 1656-8820 [employer]. [address] [place] [address], [place] [address]. All rights reserved. This information is not intended as a substitute for professional medical care. Always follow your healthcare professional's instructions.

## 2020-10-14 NOTE — PROGRESS NOTES
Verified pt ID using name and . NKDA. Administered vaccinations per physician order using aseptic technique. Aspirated and no blood return noted. Pt tolerated well with no adverse reactions noted.

## 2020-10-21 ENCOUNTER — OFFICE VISIT (OUTPATIENT)
Dept: PRIMARY CARE CLINIC | Facility: CLINIC | Age: 25
End: 2020-10-21
Payer: MEDICAID

## 2020-10-21 VITALS
TEMPERATURE: 99 F | WEIGHT: 312.94 LBS | DIASTOLIC BLOOD PRESSURE: 86 MMHG | OXYGEN SATURATION: 98 % | HEART RATE: 104 BPM | BODY MASS INDEX: 40.16 KG/M2 | RESPIRATION RATE: 18 BRPM | SYSTOLIC BLOOD PRESSURE: 128 MMHG | HEIGHT: 74 IN

## 2020-10-21 DIAGNOSIS — R10.9 BILATERAL FLANK PAIN: Primary | ICD-10-CM

## 2020-10-21 DIAGNOSIS — N26.1 ATROPHIC KIDNEY: ICD-10-CM

## 2020-10-21 DIAGNOSIS — Z98.890 HISTORY OF KIDNEY SURGERY: ICD-10-CM

## 2020-10-21 PROCEDURE — 99214 OFFICE O/P EST MOD 30 MIN: CPT | Mod: PBBFAC,PN | Performed by: STUDENT IN AN ORGANIZED HEALTH CARE EDUCATION/TRAINING PROGRAM

## 2020-10-21 PROCEDURE — 99999 PR PBB SHADOW E&M-EST. PATIENT-LVL IV: CPT | Mod: PBBFAC,,, | Performed by: STUDENT IN AN ORGANIZED HEALTH CARE EDUCATION/TRAINING PROGRAM

## 2020-10-21 PROCEDURE — 99214 PR OFFICE/OUTPT VISIT, EST, LEVL IV, 30-39 MIN: ICD-10-PCS | Mod: S$PBB,,, | Performed by: STUDENT IN AN ORGANIZED HEALTH CARE EDUCATION/TRAINING PROGRAM

## 2020-10-21 PROCEDURE — 99999 PR PBB SHADOW E&M-EST. PATIENT-LVL IV: ICD-10-PCS | Mod: PBBFAC,,, | Performed by: STUDENT IN AN ORGANIZED HEALTH CARE EDUCATION/TRAINING PROGRAM

## 2020-10-21 PROCEDURE — 99214 OFFICE O/P EST MOD 30 MIN: CPT | Mod: S$PBB,,, | Performed by: STUDENT IN AN ORGANIZED HEALTH CARE EDUCATION/TRAINING PROGRAM

## 2020-10-21 RX ORDER — ACETAMINOPHEN 500 MG
1000 TABLET ORAL EVERY 12 HOURS PRN
Qty: 40 TABLET | Refills: 0 | Status: SHIPPED | OUTPATIENT
Start: 2020-10-21 | End: 2023-02-06

## 2020-10-21 NOTE — PROGRESS NOTES
"Subjective:       Patient ID: Navneet Sanabria is a 24 y.o. male.    Chief Complaint: Flank Pain (for about a week now )    HPI   24 y.o. male presents to [facility] with complaints of bilateral kidney pains that have been present x1 week. Reports history of episodes that come a few times per year. 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 Had renal surgery at age 9 for "kidney reflux". No pain with urination. Possibly had urethral reflux. Uncertain if had UTIs as a child. No Pain with urination at this time. Feels like a random jabbing pain, unrelated to time of urination. Feels that it sometimes comes in rhythmic waves. Was supposed to get an US of his kidneys earlier this year, but missed appointment due to work.    No history of kidney stones. No blood in urine.    Reports history of addiction to narcotic pain medications and requesting not to be on this type of medication    Review of Systems   Constitutional: Negative for chills, diaphoresis, fatigue and fever.   HENT: Negative for congestion, sinus pressure, sneezing and sore throat.    Eyes: Positive for discharge (watery).   Respiratory: Positive for shortness of breath (when smoking). Negative for cough.    Cardiovascular: Negative for chest pain and palpitations.   Gastrointestinal: Negative for abdominal pain, diarrhea, nausea and vomiting.   Genitourinary: Positive for flank pain. Negative for difficulty urinating, discharge, dysuria, enuresis, frequency, hematuria and testicular pain.   Musculoskeletal: Negative for joint swelling and myalgias.   Skin: Negative for rash and wound.       Objective:      Vitals:    10/21/20 1521   BP: 128/86   BP Location: Right arm   Patient Position: Sitting   BP Method: Medium (Manual)   Pulse: 104   Resp: 18   Temp: 98.5 °F (36.9 °C)   TempSrc: Oral   SpO2: 98%   Weight: (!) 142 kg (312 lb 15.1 oz)   Height: 6' 2" (1.88 m)     Physical Exam  Vitals signs reviewed.   Constitutional:       General: He is not in acute distress.   "   Appearance: Normal appearance. He is obese. He is not ill-appearing.   HENT:      Head: Normocephalic and atraumatic.      Mouth/Throat:      Mouth: Mucous membranes are moist.      Pharynx: Oropharynx is clear.   Eyes:      General:         Right eye: No discharge.         Left eye: No discharge.   Cardiovascular:      Rate and Rhythm: Normal rate and regular rhythm.      Pulses: Normal pulses.      Heart sounds: Normal heart sounds.   Pulmonary:      Effort: Pulmonary effort is normal.      Breath sounds: Normal breath sounds.   Chest:      Chest wall: No tenderness.   Abdominal:      General: Bowel sounds are normal. There is no distension.      Palpations: Abdomen is soft. There is no mass.      Tenderness: There is no abdominal tenderness. There is no right CVA tenderness, left CVA tenderness, guarding or rebound.      Hernia: No hernia is present.   Musculoskeletal:         General: No deformity.   Skin:     General: Skin is warm and dry.      Coloration: Skin is not jaundiced.   Neurological:      General: No focal deficit present.      Mental Status: He is alert and oriented to person, place, and time.   Psychiatric:         Mood and Affect: Mood normal.         Behavior: Behavior normal.             Lab Results   Component Value Date     03/06/2020    K 3.7 03/06/2020     03/06/2020    CO2 24 03/06/2020    BUN 13 03/06/2020    CREATININE 0.9 03/06/2020    ANIONGAP 8 03/06/2020     No results found for: HGBA1C  No results found for: BNP, BNPTRIAGEBLO    Lab Results   Component Value Date    WBC 9.40 03/06/2020    HGB 16.2 03/06/2020    HCT 46.6 03/06/2020     03/06/2020    GRAN 4.6 03/06/2020    GRAN 48.8 03/06/2020     No results found for: CHOL, HDL, LDLCALC, TRIG       Current Outpatient Medications:     ARIPiprazole (ABILIFY) 10 MG Tab, Take 10 mg by mouth every morning., Disp: , Rfl:     busPIRone (BUSPAR) 15 MG tablet, Take 15 mg by mouth 2 (two) times daily., Disp: , Rfl:      VYVANSE 30 mg capsule, Take 30 mg by mouth once daily., Disp: , Rfl:     acetaminophen (TYLENOL) 500 MG tablet, Take 2 tablets (1,000 mg total) by mouth every 12 (twelve) hours as needed for Pain., Disp: 40 tablet, Rfl: 0    gabapentin (NEURONTIN) 300 MG capsule, Take 1 capsule (300 mg total) by mouth 3 (three) times daily., Disp: 90 capsule, Rfl: 2    ibuprofen (ADVIL,MOTRIN) 800 MG tablet, Take 1 tablet (800 mg total) by mouth 3 (three) times daily as needed for Pain. (Patient not taking: Reported on 10/14/2020), Disp: 30 tablet, Rfl: 1    mirtazapine (REMERON) 30 MG tablet, Take 1 tablet (30 mg total) by mouth every evening., Disp: 30 tablet, Rfl: 2    propranoloL (INDERAL) 10 MG tablet, Take 3 tablets (30 mg total) by mouth 2 (two) times daily., Disp: 60 tablet, Rfl: 2        Assessment:       1. Bilateral flank pain    2. History of kidney surgery    3. Atrophic kidney           Plan:       Bilateral flank pain  History of kidney surgery  Atrophic kidney  -     Urinalysis, Reflex to Urine Culture Urine, Clean Catch  -     US Doppler Abd/Retroperitoneal Limited; Future; Expected date: 10/21/2020  -     Basic Metabolic Panel; Future; Expected date: 10/21/2020  -     acetaminophen (TYLENOL) 500 MG tablet; Take 2 tablets (1,000 mg total) by mouth every 12 (twelve) hours as needed for Pain.  Dispense: 40 tablet; Refill: 0  -  Instructed patient to avoid NSAID use, avoid narcotic pain meds with history of addiction  - Report to ED for fevers or intractable pain  - Recommend warm compresses to back  - CT renal stone 2/10/2018 reviewed today's visit with atrophic left kidney and no renal stone or hydronephrosis    RTC if symptoms symptoms worsen

## 2020-10-21 NOTE — PATIENT INSTRUCTIONS
Flank Pain, Uncertain Cause  The flank is the area between your upper abdomen and your back. Pain there is often caused by a problem with your kidneys. It might be a kidney infection or a kidney stone. Other causes of flank pain include spinal arthritis, a pinched nerve from a back injury, or a back muscle strain or spasm.  The cause of your flank pain is not certain. You may need other tests.  Home care  Follow these tips when caring for yourself at home:  · You may use acetaminophen or ibuprofen to control pain, unless your health care provider prescribed another medicine. If you have chronic liver or kidney disease, talk with your provider before taking these medicines. Also talk with your provider first if youve ever had a stomach ulcer or GI bleeding.  · If the pain is coming from your muscles, you may get relief with ice or heat. During the first 2 days after the injury, put an ice pack on the painful area for 20 minutes every 2 to 4 hours. This will reduce swelling and pain. A hot shower, hot bath, or heating pad works well for a muscle spasm. You can start with ice, then switch to heat after 2 days. You might find that alternating ice and heat works well. Use the method that feels the best to you.  Follow-up care  Follow up with your healthcare provider if your symptoms dont get better over the next few days.  When to seek medical advice  Call your healthcare provider right away if any of these happen:  · Repeated vomiting  · Fever of 100.4ºF (38ºC) or higher, or as directed by your health care provider  · Flank pain that gets worse  · Pain that spreads to the front of your belly (abdomen)  · Dizziness, weakness, or fainting  · Blood in your urine  · Burning feeling when you urinate or the need to urinate often  · Pain in one of your legs that gets worse  · Numbness or weakness in a leg  Date Last Reviewed: 10/1/2016  © 8107-8837 [employer]. [address] [place] [address], [place] [address]. All  rights reserved. This information is not intended as a substitute for professional medical care. Always follow your healthcare professional's instructions.

## 2020-11-25 ENCOUNTER — TELEPHONE (OUTPATIENT)
Dept: PRIMARY CARE CLINIC | Facility: CLINIC | Age: 25
End: 2020-11-25

## 2020-11-25 DIAGNOSIS — F90.0 ATTENTION DEFICIT HYPERACTIVITY DISORDER (ADHD), PREDOMINANTLY INATTENTIVE TYPE: ICD-10-CM

## 2020-11-25 DIAGNOSIS — F41.9 ANXIETY: Primary | ICD-10-CM

## 2020-11-25 DIAGNOSIS — Z92.89 HISTORY OF CARDIAC MONITORING: ICD-10-CM

## 2020-11-25 NOTE — TELEPHONE ENCOUNTER
----- Message from Neris Bustos sent at 11/25/2020  2:10 PM CST -----  Regarding: Pt [phone]  The patient's non [facility] advise him to call you to ask you to order an EKG because, he takes Adderall.    Thank you

## 2020-12-10 ENCOUNTER — CLINICAL SUPPORT (OUTPATIENT)
Dept: PRIMARY CARE CLINIC | Facility: CLINIC | Age: 25
End: 2020-12-10
Payer: MEDICAID

## 2020-12-10 DIAGNOSIS — F41.9 ANXIETY: ICD-10-CM

## 2020-12-10 DIAGNOSIS — F90.0 ATTENTION DEFICIT HYPERACTIVITY DISORDER (ADHD), PREDOMINANTLY INATTENTIVE TYPE: ICD-10-CM

## 2020-12-10 DIAGNOSIS — Z92.89 HISTORY OF CARDIAC MONITORING: ICD-10-CM

## 2020-12-10 DIAGNOSIS — Z23 IMMUNIZATION DUE: Primary | ICD-10-CM

## 2020-12-10 PROCEDURE — 93010 EKG 12-LEAD: ICD-10-PCS | Mod: S$PBB,,, | Performed by: INTERNAL MEDICINE

## 2020-12-10 PROCEDURE — 93010 ELECTROCARDIOGRAM REPORT: CPT | Mod: S$PBB,,, | Performed by: INTERNAL MEDICINE

## 2020-12-10 PROCEDURE — 93005 ELECTROCARDIOGRAM TRACING: CPT | Mod: PBBFAC,PN | Performed by: INTERNAL MEDICINE

## 2020-12-10 NOTE — PROGRESS NOTES
Pt. Arrived to clinic, verified name and . Needed an EKG for his psychiatrist, Handy Bazan at [facility] in [place]. EKG normal. Notified MD.

## 2021-02-03 ENCOUNTER — OFFICE VISIT (OUTPATIENT)
Dept: PRIMARY CARE CLINIC | Facility: CLINIC | Age: 26
End: 2021-02-03
Payer: MEDICAID

## 2021-02-03 DIAGNOSIS — K31.84 GASTROPARESIS: ICD-10-CM

## 2021-02-03 DIAGNOSIS — E66.3 OVERWEIGHT: ICD-10-CM

## 2021-02-03 DIAGNOSIS — K58.0 IRRITABLE BOWEL SYNDROME WITH DIARRHEA: ICD-10-CM

## 2021-02-03 DIAGNOSIS — F31.9 BIPOLAR AFFECTIVE DISORDER, REMISSION STATUS UNSPECIFIED: ICD-10-CM

## 2021-02-03 DIAGNOSIS — R10.33 PERIUMBILICAL ABDOMINAL PAIN: Primary | ICD-10-CM

## 2021-02-03 DIAGNOSIS — Z72.0 TOBACCO ABUSE: ICD-10-CM

## 2021-02-03 DIAGNOSIS — R25.9 DYSFUNCTIONAL MOVEMENTS: ICD-10-CM

## 2021-02-03 DIAGNOSIS — K21.9 GASTROESOPHAGEAL REFLUX DISEASE, UNSPECIFIED WHETHER ESOPHAGITIS PRESENT: ICD-10-CM

## 2021-02-03 DIAGNOSIS — R11.2 NAUSEA AND VOMITING, INTRACTABILITY OF VOMITING NOT SPECIFIED, UNSPECIFIED VOMITING TYPE: ICD-10-CM

## 2021-02-03 PROCEDURE — 99214 OFFICE O/P EST MOD 30 MIN: CPT | Mod: 95,,, | Performed by: FAMILY MEDICINE

## 2021-02-03 PROCEDURE — 99214 PR OFFICE/OUTPT VISIT, EST, LEVL IV, 30-39 MIN: ICD-10-PCS | Mod: 95,,, | Performed by: FAMILY MEDICINE

## 2021-02-03 RX ORDER — ONDANSETRON 4 MG/1
4 TABLET, FILM COATED ORAL EVERY 6 HOURS PRN
Qty: 30 TABLET | Refills: 2 | Status: SHIPPED | OUTPATIENT
Start: 2021-02-03 | End: 2021-10-05

## 2021-02-03 RX ORDER — OMEPRAZOLE 40 MG/1
40 CAPSULE, DELAYED RELEASE ORAL DAILY
Qty: 30 CAPSULE | Refills: 5 | Status: SHIPPED | OUTPATIENT
Start: 2021-02-03 | End: 2021-10-05

## 2021-10-05 ENCOUNTER — CLINICAL SUPPORT (OUTPATIENT)
Dept: URGENT CARE | Facility: CLINIC | Age: 26
End: 2021-10-05
Payer: MEDICAID

## 2021-10-05 DIAGNOSIS — Z20.822 COVID-19 RULED OUT BY LABORATORY TESTING: Primary | ICD-10-CM

## 2021-10-05 PROBLEM — F31.9 BIPOLAR 1 DISORDER: Status: ACTIVE | Noted: 2021-10-05

## 2021-10-05 LAB
CTP QC/QA: YES
SARS-COV-2 RDRP RESP QL NAA+PROBE: NEGATIVE

## 2021-10-05 PROCEDURE — 87635 SARS-COV-2 COVID-19 AMP PRB: CPT | Mod: QW,S$GLB,, | Performed by: PHYSICIAN ASSISTANT

## 2021-10-05 PROCEDURE — 87635: ICD-10-PCS | Mod: QW,S$GLB,, | Performed by: PHYSICIAN ASSISTANT

## 2021-10-06 PROBLEM — R44.3 HALLUCINATIONS: Status: ACTIVE | Noted: 2021-10-06

## 2022-01-10 ENCOUNTER — TELEPHONE (OUTPATIENT)
Dept: PRIMARY CARE CLINIC | Facility: CLINIC | Age: 27
End: 2022-01-10
Payer: MEDICAID

## 2022-01-11 NOTE — TELEPHONE ENCOUNTER
----- Message from Augustine Mead sent at 1/10/2022  3:41 PM CST -----  Contact: pt  No blue slot available to schedule an appointment for the patient.  Patient is established with which PCP: [facility]  Reason for the visit: pain in legs and tightness in chest  Would the patient like a call back, or a response through their [facility] portal?:  call

## 2022-01-12 ENCOUNTER — OFFICE VISIT (OUTPATIENT)
Dept: PRIMARY CARE CLINIC | Facility: CLINIC | Age: 27
End: 2022-01-12
Payer: MEDICAID

## 2022-01-12 VITALS
SYSTOLIC BLOOD PRESSURE: 116 MMHG | OXYGEN SATURATION: 97 % | HEIGHT: 75 IN | HEART RATE: 94 BPM | BODY MASS INDEX: 39.17 KG/M2 | WEIGHT: 315 LBS | DIASTOLIC BLOOD PRESSURE: 86 MMHG | RESPIRATION RATE: 18 BRPM

## 2022-01-12 DIAGNOSIS — Z72.0 TOBACCO ABUSE: ICD-10-CM

## 2022-01-12 DIAGNOSIS — Z13.220 ENCOUNTER FOR LIPID SCREENING FOR CARDIOVASCULAR DISEASE: ICD-10-CM

## 2022-01-12 DIAGNOSIS — F31.9 BIPOLAR AFFECTIVE DISORDER, REMISSION STATUS UNSPECIFIED: ICD-10-CM

## 2022-01-12 DIAGNOSIS — E66.3 OVERWEIGHT: ICD-10-CM

## 2022-01-12 DIAGNOSIS — M54.41 ACUTE MIDLINE LOW BACK PAIN WITH RIGHT-SIDED SCIATICA: Primary | ICD-10-CM

## 2022-01-12 DIAGNOSIS — Z13.6 ENCOUNTER FOR LIPID SCREENING FOR CARDIOVASCULAR DISEASE: ICD-10-CM

## 2022-01-12 DIAGNOSIS — Z11.59 NEED FOR HEPATITIS C SCREENING TEST: ICD-10-CM

## 2022-01-12 PROCEDURE — 3079F DIAST BP 80-89 MM HG: CPT | Mod: CPTII,,, | Performed by: INTERNAL MEDICINE

## 2022-01-12 PROCEDURE — 3074F SYST BP LT 130 MM HG: CPT | Mod: CPTII,,, | Performed by: INTERNAL MEDICINE

## 2022-01-12 PROCEDURE — 1159F PR MEDICATION LIST DOCUMENTED IN MEDICAL RECORD: ICD-10-PCS | Mod: CPTII,,, | Performed by: INTERNAL MEDICINE

## 2022-01-12 PROCEDURE — 99999 PR PBB SHADOW E&M-EST. PATIENT-LVL IV: ICD-10-PCS | Mod: PBBFAC,,, | Performed by: INTERNAL MEDICINE

## 2022-01-12 PROCEDURE — 1160F RVW MEDS BY RX/DR IN RCRD: CPT | Mod: CPTII,,, | Performed by: INTERNAL MEDICINE

## 2022-01-12 PROCEDURE — 3008F PR BODY MASS INDEX (BMI) DOCUMENTED: ICD-10-PCS | Mod: CPTII,,, | Performed by: INTERNAL MEDICINE

## 2022-01-12 PROCEDURE — 1159F MED LIST DOCD IN RCRD: CPT | Mod: CPTII,,, | Performed by: INTERNAL MEDICINE

## 2022-01-12 PROCEDURE — 3079F PR MOST RECENT DIASTOLIC BLOOD PRESSURE 80-89 MM HG: ICD-10-PCS | Mod: CPTII,,, | Performed by: INTERNAL MEDICINE

## 2022-01-12 PROCEDURE — 99214 OFFICE O/P EST MOD 30 MIN: CPT | Mod: S$PBB,,, | Performed by: INTERNAL MEDICINE

## 2022-01-12 PROCEDURE — 99214 OFFICE O/P EST MOD 30 MIN: CPT | Mod: PBBFAC,PN | Performed by: INTERNAL MEDICINE

## 2022-01-12 PROCEDURE — 1160F PR REVIEW ALL MEDS BY PRESCRIBER/CLIN PHARMACIST DOCUMENTED: ICD-10-PCS | Mod: CPTII,,, | Performed by: INTERNAL MEDICINE

## 2022-01-12 PROCEDURE — 99214 PR OFFICE/OUTPT VISIT, EST, LEVL IV, 30-39 MIN: ICD-10-PCS | Mod: S$PBB,,, | Performed by: INTERNAL MEDICINE

## 2022-01-12 PROCEDURE — 3008F BODY MASS INDEX DOCD: CPT | Mod: CPTII,,, | Performed by: INTERNAL MEDICINE

## 2022-01-12 PROCEDURE — 99999 PR PBB SHADOW E&M-EST. PATIENT-LVL IV: CPT | Mod: PBBFAC,,, | Performed by: INTERNAL MEDICINE

## 2022-01-12 PROCEDURE — 3074F PR MOST RECENT SYSTOLIC BLOOD PRESSURE < 130 MM HG: ICD-10-PCS | Mod: CPTII,,, | Performed by: INTERNAL MEDICINE

## 2022-01-12 RX ORDER — DEXAMETHASONE 4 MG/1
4 TABLET ORAL EVERY 12 HOURS
Qty: 14 TABLET | Refills: 0 | Status: SHIPPED | OUTPATIENT
Start: 2022-01-12 | End: 2023-02-06

## 2022-01-12 RX ORDER — GABAPENTIN 300 MG/1
300 CAPSULE ORAL 3 TIMES DAILY
Qty: 90 CAPSULE | Refills: 11 | Status: SHIPPED | OUTPATIENT
Start: 2022-01-12 | End: 2023-03-15 | Stop reason: SDUPTHER

## 2022-01-12 NOTE — PROGRESS NOTES
Subjective:       Patient ID: Navneet Sanabria is a 26 y.o. male.    Chief Complaint: Leg Pain (Burning on the right inner thigh - 1 week), Nausea (1 week), and Back Pain    HPI   patient visit today with history of bipolar affective disorder obesity history of recurrent back pain his back pain has been flare-up in the last 1 and half week pain in the middle of the lower back radiating into the right leg right thigh his back hurt in the past but not as bad as knees his states that he cannot bend over to  anything due to back pain and standing hurt the most.  He is working at night and he has been follow up with his psychiatrist for his bipolar disorder he appear to be stable no confusion no agitation  Review of Systems    Objective:      Physical Exam  Vitals and nursing note reviewed.   Constitutional:       General: He is not in acute distress.     Appearance: He is well-developed and well-nourished.   HENT:      Head: Normocephalic and atraumatic.      Right Ear: External ear normal.      Left Ear: External ear normal.      Nose: Nose normal.      Mouth/Throat:      Mouth: Oropharynx is clear and moist.      Pharynx: No oropharyngeal exudate.   Eyes:      Extraocular Movements: Extraocular movements intact and EOM normal.      Conjunctiva/sclera: Conjunctivae normal.      Pupils: Pupils are equal, round, and reactive to light.   Neck:      Thyroid: No thyromegaly.   Cardiovascular:      Rate and Rhythm: Normal rate and regular rhythm.      Pulses: Intact distal pulses.      Heart sounds: Normal heart sounds. No murmur heard.  No friction rub. No gallop.    Pulmonary:      Effort: Pulmonary effort is normal. No respiratory distress.      Breath sounds: Normal breath sounds. No wheezing.   Abdominal:      General: Bowel sounds are normal. There is no distension.      Palpations: Abdomen is soft.      Tenderness: There is no abdominal tenderness.   Musculoskeletal:         General: Tenderness (Tenderness in the  middle of the lower back radiating into the right buttock and lateral posterior right thigh) present. No deformity or edema. Normal range of motion.      Cervical back: Normal range of motion and neck supple.   Lymphadenopathy:      Cervical: No cervical adenopathy.   Skin:     General: Skin is warm and dry.      Capillary Refill: Capillary refill takes less than 2 seconds.      Findings: No erythema or rash.   Neurological:      Mental Status: He is alert and oriented to person, place, and time.   Psychiatric:         Mood and Affect: Mood and affect normal.         Thought Content: Thought content normal.         Judgment: Judgment normal.         Assessment:       1. Acute midline low back pain with right-sided sciatica    2. Need for hepatitis C screening test    3. Encounter for lipid screening for cardiovascular disease    4. Overweight    5. Bipolar affective disorder, remission status unspecified    6. Tobacco abuse        Plan:       Acute midline low back pain with right-sided sciatica  -     X-Ray Lumbar Spine Ap And Lateral; Future; Expected date: 01/12/2022  -     dexAMETHasone (DECADRON) 4 MG Tab; Take 1 tablet (4 mg total) by mouth every 12 (twelve) hours.  Dispense: 14 tablet; Refill: 0  -     gabapentin (NEURONTIN) 300 MG capsule; Take 1 capsule (300 mg total) by mouth 3 (three) times daily.  Dispense: 90 capsule; Refill: 11    Need for hepatitis C screening test  -     Hepatitis C Antibody; Future; Expected date: 01/12/2022  -     HIV 1/2 Ag/Ab (4th Gen); Future; Expected date: 01/12/2022    Encounter for lipid screening for cardiovascular disease  -     Lipid Panel; Future; Expected date: 01/12/2022    Overweight    Bipolar affective disorder, remission status unspecified  Comments:  Continue with treatment and follow-up with his psychiatrist    Tobacco abuse  -     Ambulatory referral/consult to Smoking Cessation Program; Future; Expected date: 01/19/2022        Medication List with  Changes/Refills   New Medications    DEXAMETHASONE (DECADRON) 4 MG TAB    Take 1 tablet (4 mg total) by mouth every 12 (twelve) hours.    GABAPENTIN (NEURONTIN) 300 MG CAPSULE    Take 1 capsule (300 mg total) by mouth 3 (three) times daily.   Current Medications    ACETAMINOPHEN (TYLENOL) 500 MG TABLET    Take 2 tablets (1,000 mg total) by mouth every 12 (twelve) hours as needed for Pain.    BUSPIRONE (BUSPAR) 10 MG TABLET    Take 1 tablet (10 mg total) by mouth 2 (two) times daily.    LAMOTRIGINE (LAMICTAL) 100 MG TABLET    Take 1 tablet (100 mg total) by mouth once daily.    MIRTAZAPINE (REMERON) 7.5 MG TAB    Take 1 tablet (7.5 mg total) by mouth every evening.    ZIPRASIDONE (GEODON) 40 MG CAP    Take 1 capsule (40 mg total) by mouth every evening.   Discontinued Medications    DEXTROAMPHETAMINE-AMPHETAMINE (ADDERALL) 12.5 MG TABLET    Take 30 mg by mouth once daily.    FAMOTIDINE (PEPCID) 20 MG TABLET    Take 1 tablet (20 mg total) by mouth 2 (two) times daily.

## 2022-01-27 ENCOUNTER — LAB VISIT (OUTPATIENT)
Dept: PRIMARY CARE CLINIC | Facility: CLINIC | Age: 27
End: 2022-01-27
Payer: MEDICAID

## 2022-01-27 DIAGNOSIS — Z20.822 CONTACT WITH AND (SUSPECTED) EXPOSURE TO COVID-19: ICD-10-CM

## 2022-01-27 LAB
CTP QC/QA: YES
SARS-COV-2 AG RESP QL IA.RAPID: NEGATIVE

## 2022-01-27 PROCEDURE — 87811 SARS-COV-2 COVID19 W/OPTIC: CPT

## 2022-03-11 PROBLEM — K52.9 COLITIS: Status: ACTIVE | Noted: 2022-03-11

## 2022-03-14 ENCOUNTER — PATIENT OUTREACH (OUTPATIENT)
Dept: ADMINISTRATIVE | Facility: HOSPITAL | Age: 27
End: 2022-03-14
Payer: MEDICAID

## 2023-02-06 ENCOUNTER — OFFICE VISIT (OUTPATIENT)
Dept: PRIMARY CARE CLINIC | Facility: CLINIC | Age: 28
End: 2023-02-06
Payer: MEDICAID

## 2023-02-06 VITALS
BODY MASS INDEX: 40.43 KG/M2 | HEIGHT: 74 IN | SYSTOLIC BLOOD PRESSURE: 120 MMHG | RESPIRATION RATE: 16 BRPM | WEIGHT: 315 LBS | TEMPERATURE: 98 F | DIASTOLIC BLOOD PRESSURE: 84 MMHG | HEART RATE: 97 BPM | OXYGEN SATURATION: 97 %

## 2023-02-06 DIAGNOSIS — Z72.0 TOBACCO ABUSE: ICD-10-CM

## 2023-02-06 DIAGNOSIS — Z11.59 NEED FOR HEPATITIS C SCREENING TEST: ICD-10-CM

## 2023-02-06 DIAGNOSIS — Z23 FLU VACCINE NEED: ICD-10-CM

## 2023-02-06 DIAGNOSIS — Z11.4 ENCOUNTER FOR SCREENING FOR HIV: ICD-10-CM

## 2023-02-06 DIAGNOSIS — F19.10 SUBSTANCE ABUSE: ICD-10-CM

## 2023-02-06 DIAGNOSIS — Z13.220 SCREENING CHOLESTEROL LEVEL: ICD-10-CM

## 2023-02-06 DIAGNOSIS — M79.662 PAIN OF LEFT CALF: Primary | ICD-10-CM

## 2023-02-06 PROCEDURE — 1160F RVW MEDS BY RX/DR IN RCRD: CPT | Mod: CPTII,,, | Performed by: STUDENT IN AN ORGANIZED HEALTH CARE EDUCATION/TRAINING PROGRAM

## 2023-02-06 PROCEDURE — 99214 OFFICE O/P EST MOD 30 MIN: CPT | Mod: S$PBB,,, | Performed by: STUDENT IN AN ORGANIZED HEALTH CARE EDUCATION/TRAINING PROGRAM

## 2023-02-06 PROCEDURE — 3074F SYST BP LT 130 MM HG: CPT | Mod: CPTII,,, | Performed by: STUDENT IN AN ORGANIZED HEALTH CARE EDUCATION/TRAINING PROGRAM

## 2023-02-06 PROCEDURE — 1160F PR REVIEW ALL MEDS BY PRESCRIBER/CLIN PHARMACIST DOCUMENTED: ICD-10-PCS | Mod: CPTII,,, | Performed by: STUDENT IN AN ORGANIZED HEALTH CARE EDUCATION/TRAINING PROGRAM

## 2023-02-06 PROCEDURE — 99999 PR PBB SHADOW E&M-EST. PATIENT-LVL V: ICD-10-PCS | Mod: PBBFAC,,, | Performed by: STUDENT IN AN ORGANIZED HEALTH CARE EDUCATION/TRAINING PROGRAM

## 2023-02-06 PROCEDURE — 3008F BODY MASS INDEX DOCD: CPT | Mod: CPTII,,, | Performed by: STUDENT IN AN ORGANIZED HEALTH CARE EDUCATION/TRAINING PROGRAM

## 2023-02-06 PROCEDURE — 3079F DIAST BP 80-89 MM HG: CPT | Mod: CPTII,,, | Performed by: STUDENT IN AN ORGANIZED HEALTH CARE EDUCATION/TRAINING PROGRAM

## 2023-02-06 PROCEDURE — 3074F PR MOST RECENT SYSTOLIC BLOOD PRESSURE < 130 MM HG: ICD-10-PCS | Mod: CPTII,,, | Performed by: STUDENT IN AN ORGANIZED HEALTH CARE EDUCATION/TRAINING PROGRAM

## 2023-02-06 PROCEDURE — 99999 PR PBB SHADOW E&M-EST. PATIENT-LVL V: CPT | Mod: PBBFAC,,, | Performed by: STUDENT IN AN ORGANIZED HEALTH CARE EDUCATION/TRAINING PROGRAM

## 2023-02-06 PROCEDURE — 99215 OFFICE O/P EST HI 40 MIN: CPT | Mod: PBBFAC,PN | Performed by: STUDENT IN AN ORGANIZED HEALTH CARE EDUCATION/TRAINING PROGRAM

## 2023-02-06 PROCEDURE — 3079F PR MOST RECENT DIASTOLIC BLOOD PRESSURE 80-89 MM HG: ICD-10-PCS | Mod: CPTII,,, | Performed by: STUDENT IN AN ORGANIZED HEALTH CARE EDUCATION/TRAINING PROGRAM

## 2023-02-06 PROCEDURE — 1159F PR MEDICATION LIST DOCUMENTED IN MEDICAL RECORD: ICD-10-PCS | Mod: CPTII,,, | Performed by: STUDENT IN AN ORGANIZED HEALTH CARE EDUCATION/TRAINING PROGRAM

## 2023-02-06 PROCEDURE — 3008F PR BODY MASS INDEX (BMI) DOCUMENTED: ICD-10-PCS | Mod: CPTII,,, | Performed by: STUDENT IN AN ORGANIZED HEALTH CARE EDUCATION/TRAINING PROGRAM

## 2023-02-06 PROCEDURE — 99214 PR OFFICE/OUTPT VISIT, EST, LEVL IV, 30-39 MIN: ICD-10-PCS | Mod: S$PBB,,, | Performed by: STUDENT IN AN ORGANIZED HEALTH CARE EDUCATION/TRAINING PROGRAM

## 2023-02-06 PROCEDURE — 90471 IMMUNIZATION ADMIN: CPT | Mod: PBBFAC,PN

## 2023-02-06 PROCEDURE — 1159F MED LIST DOCD IN RCRD: CPT | Mod: CPTII,,, | Performed by: STUDENT IN AN ORGANIZED HEALTH CARE EDUCATION/TRAINING PROGRAM

## 2023-02-06 RX ORDER — CLONAZEPAM 0.5 MG/1
0.5 TABLET ORAL 2 TIMES DAILY
COMMUNITY
Start: 2023-01-20

## 2023-02-06 NOTE — PATIENT INSTRUCTIONS
Left Calf Pain:   - patient with 2 months of left calf pain worse with exertion.  Located to lateral portion of left calf radiating down to lateral portion of ankle.   - improves with rest.  No redness or inflammation noted.  No palpable nodules or lesions.   - ordering an arterial ultrasound of left lower leg.   - advised patient to keep well hydrated.  Advised patient to not smoke and avoid use of marijuana or other stimulants that can help reduce hydration and oxygen to the extremity.   - over time would work on weight loss to help take weight off leg.     Tobacco use:   - advise cessation, patient agrees.     - order placed for cessation program.     Annual:   - getting fasting labs.

## 2023-02-06 NOTE — PROGRESS NOTES
Verified pt ID using name and . NKDA. Administered Influenza in right deltoid per physician order using aseptic technique. Aspirated and no blood return noted. Pt tolerated well with no adverse reactions noted.

## 2023-02-06 NOTE — PROGRESS NOTES
Subjective:           Patient ID: Navneet Sanabria is a 27 y.o. male who presents today with a chief complaint of left calf pain.    Chief Complaint:   Leg Pain (Left calf)      History of Present Illness:    26yo male presenting for left calf pain.  Notices when he walks for a while, like a mile.  Get from other exercises like biking as well.  If keeps walking pain gets worse.  No swelling.  No redness.   Only on the lateral portion of the left leg.  For about 2 months.   Had not had this pain before.    Pain is localized. From upper calf and down to lateral ankle, pain mostly at the top.     No numbness or tingling to the left foot (does get some on the right, but not related).     Has tried tylenol and ibuprofen, but seems to quickly as gets better once resting.     Pain passes after 20-30 minutes, but needs to take all pressure off it.     Smokes about a pack of cigarettes daily and smokes marijuana 1-2 times a day.     Using Vyvanse some days, helps with focus on computer work.  Doesn' t on calmer days.     Review of Systems   Constitutional: Negative.  Negative for fatigue and fever.   HENT:  Negative for congestion, rhinorrhea, sinus pressure, sinus pain and tinnitus.    Eyes: Negative.    Respiratory: Negative.  Negative for cough, shortness of breath and wheezing.    Cardiovascular: Negative.  Negative for chest pain, palpitations and leg swelling.   Gastrointestinal: Negative.  Negative for constipation, diarrhea, nausea and vomiting.   Endocrine: Negative.    Genitourinary: Negative.  Negative for difficulty urinating, frequency and urgency.   Musculoskeletal:  Positive for arthralgias (Left lateral ankle and calf pain) and gait problem.   Skin: Negative.  Negative for color change and rash.   Allergic/Immunologic: Negative for food allergies.   Neurological:  Positive for numbness. Negative for dizziness and headaches.   Psychiatric/Behavioral:  Positive for sleep disturbance.          Objective:       "  Vitals:    02/06/23 1359   BP: 120/84   BP Location: Right arm   Patient Position: Sitting   BP Method: Medium (Manual)   Pulse: 97   Resp: 16   Temp: 98.2 °F (36.8 °C)   TempSrc: Temporal   SpO2: 97%   Weight: (!) 143.6 kg (316 lb 7.5 oz)   Height: 6' 2" (1.88 m)       Body mass index is 40.63 kg/m².      Physical Exam  Constitutional:       Appearance: Normal appearance. He is not toxic-appearing.      Comments: As per BMI.   HENT:      Head: Normocephalic and atraumatic.      Right Ear: External ear normal.      Left Ear: External ear normal.      Nose: No congestion.      Mouth/Throat:      Mouth: Mucous membranes are moist.      Pharynx: Oropharynx is clear.   Eyes:      Extraocular Movements: Extraocular movements intact.      Conjunctiva/sclera: Conjunctivae normal.   Cardiovascular:      Rate and Rhythm: Normal rate and regular rhythm.      Heart sounds: No murmur heard.  Pulmonary:      Effort: Pulmonary effort is normal. No respiratory distress.      Breath sounds: No wheezing.   Abdominal:      General: Bowel sounds are normal.      Palpations: Abdomen is soft.      Tenderness: There is no right CVA tenderness or left CVA tenderness.   Musculoskeletal:         General: No swelling or tenderness.      Cervical back: Normal range of motion.      Right lower leg: No edema.      Left lower leg: No edema.   Skin:     General: Skin is warm.      Capillary Refill: Capillary refill takes less than 2 seconds.      Coloration: Skin is not jaundiced.      Findings: No bruising.   Neurological:      General: No focal deficit present.      Mental Status: He is alert and oriented to person, place, and time.      Motor: No weakness.   Psychiatric:         Mood and Affect: Mood normal.           Lab Results   Component Value Date     09/16/2022    K 3.5 09/16/2022     09/16/2022    CO2 23 09/16/2022    BUN 11 09/16/2022    CREATININE 0.9 09/16/2022    ANIONGAP 10 09/16/2022     No results found for: " HGBA1C  No results found for: BNP, BNPTRIAGEBLO    Lab Results   Component Value Date    WBC 9.61 09/16/2022    HGB 13.8 (L) 09/16/2022    HCT 40.9 09/16/2022     09/16/2022    GRAN 5.8 09/16/2022    GRAN 60.8 09/16/2022     No results found for: CHOL, HDL, LDLCALC, TRIG       Current Outpatient Medications:     clonazePAM (KLONOPIN) 0.5 MG tablet, Take 0.5 mg by mouth 2 (two) times daily., Disp: , Rfl:     gabapentin (NEURONTIN) 300 MG capsule, Take 1 capsule (300 mg total) by mouth 3 (three) times daily., Disp: 90 capsule, Rfl: 11    lisdexamfetamine (VYVANSE) 60 MG capsule, Take 60 mg by mouth every morning., Disp: , Rfl:     mirtazapine (REMERON) 7.5 MG Tab, Take 1 tablet (7.5 mg total) by mouth every evening., Disp: 30 tablet, Rfl: 0    ziprasidone (GEODON) 40 MG Cap, Take 1 capsule (40 mg total) by mouth every evening., Disp: 30 capsule, Rfl: 0     Outpatient Encounter Medications as of 2/6/2023   Medication Sig Dispense Refill    clonazePAM (KLONOPIN) 0.5 MG tablet Take 0.5 mg by mouth 2 (two) times daily.      gabapentin (NEURONTIN) 300 MG capsule Take 1 capsule (300 mg total) by mouth 3 (three) times daily. 90 capsule 11    lisdexamfetamine (VYVANSE) 60 MG capsule Take 60 mg by mouth every morning.      mirtazapine (REMERON) 7.5 MG Tab Take 1 tablet (7.5 mg total) by mouth every evening. 30 tablet 0    ziprasidone (GEODON) 40 MG Cap Take 1 capsule (40 mg total) by mouth every evening. 30 capsule 0    [DISCONTINUED] acetaminophen (TYLENOL) 500 MG tablet Take 2 tablets (1,000 mg total) by mouth every 12 (twelve) hours as needed for Pain. 40 tablet 0    [DISCONTINUED] busPIRone (BUSPAR) 10 MG tablet Take 1 tablet (10 mg total) by mouth 2 (two) times daily. 60 tablet 11    [DISCONTINUED] dexAMETHasone (DECADRON) 4 MG Tab Take 1 tablet (4 mg total) by mouth every 12 (twelve) hours. 14 tablet 0    [DISCONTINUED] lamoTRIgine (LAMICTAL) 100 MG tablet Take 1 tablet (100 mg total) by mouth once daily. 30  tablet 0     No facility-administered encounter medications on file as of 2/6/2023.          Assessment:       1. Pain of left calf    2. Adult BMI 40.0-44.9 kg/sq m    3. Tobacco abuse    4. Substance abuse    5. Screening cholesterol level    6. Flu vaccine need    7. Encounter for screening for HIV    8. Need for hepatitis C screening test           Plan:       Pain of left calf  -     US Lower Extremity Arteries Left; Future; Expected date: 02/06/2023    Adult BMI 40.0-44.9 kg/sq m  -     Ambulatory referral/consult to Nutrition Services; Future; Expected date: 02/13/2023    Tobacco abuse  -     Ambulatory referral/consult to Smoking Cessation Program; Future; Expected date: 02/13/2023    Substance abuse    Screening cholesterol level  -     Lipid Panel; Future; Expected date: 02/06/2023    Flu vaccine need  -     Influenza - Quadrivalent *Preferred* (6 months+) (PF)    Encounter for screening for HIV  -     HIV 1/2 Ag/Ab (4th Gen); Future; Expected date: 02/06/2023    Need for hepatitis C screening test  -     HEPATITIS C ANTIBODY; Future; Expected date: 02/06/2023                 Left Calf Pain:   - patient with 2 months of left calf pain worse with exertion.  Located to lateral portion of left calf radiating down to lateral portion of ankle.   - improves with rest.  No redness or inflammation noted.  No palpable nodules or lesions.   - ordering an arterial ultrasound of left lower leg.   - advised patient to keep well hydrated.  Advised patient to not smoke and avoid use of marijuana or other stimulants that can help reduce hydration and oxygen to the extremity.   - over time would work on weight loss to help take weight off leg.     Tobacco use:   - advise cessation, patient agrees.     - order placed for cessation program.     Annual:   - getting fasting labs.

## 2023-02-16 ENCOUNTER — TELEPHONE (OUTPATIENT)
Dept: PRIMARY CARE CLINIC | Facility: CLINIC | Age: 28
End: 2023-02-16
Payer: MEDICAID

## 2023-02-16 NOTE — TELEPHONE ENCOUNTER
----- Message from Ashley Joy sent at 2/15/2023  4:23 PM CST -----  Name Of Caller:Navneet            Provider Name:Osvaldo Ayoub            Does patient feel the need to be seen today?No            Relationship to the Pt?:Pt            Contact Preference?:[phone]            What is the nature of the call?: Pt needs  anew referral on file for a US EXTREMITY ARTERY [[id number]].

## 2023-02-16 NOTE — TELEPHONE ENCOUNTER
Returned call to patient in regards to message. I informed patient that someone will reach out to him for scheduling. Patient verbalized understand.

## 2023-03-07 ENCOUNTER — TELEPHONE (OUTPATIENT)
Dept: PRIMARY CARE CLINIC | Facility: CLINIC | Age: 28
End: 2023-03-07
Payer: MEDICAID

## 2023-03-07 NOTE — TELEPHONE ENCOUNTER
----- Message from Zulay Martinez sent at 3/7/2023  2:56 PM CST -----  Contact: [phone]  Pt would like a jonatan back about having leg pain. Please call pt back.

## 2023-03-15 ENCOUNTER — OFFICE VISIT (OUTPATIENT)
Dept: PRIMARY CARE CLINIC | Facility: CLINIC | Age: 28
End: 2023-03-15
Payer: MEDICAID

## 2023-03-15 VITALS
HEIGHT: 74 IN | SYSTOLIC BLOOD PRESSURE: 118 MMHG | OXYGEN SATURATION: 98 % | BODY MASS INDEX: 38.12 KG/M2 | WEIGHT: 297.06 LBS | TEMPERATURE: 98 F | RESPIRATION RATE: 18 BRPM | DIASTOLIC BLOOD PRESSURE: 80 MMHG | HEART RATE: 96 BPM

## 2023-03-15 DIAGNOSIS — M54.41 ACUTE MIDLINE LOW BACK PAIN WITH RIGHT-SIDED SCIATICA: ICD-10-CM

## 2023-03-15 DIAGNOSIS — R06.02 SHORTNESS OF BREATH: ICD-10-CM

## 2023-03-15 DIAGNOSIS — F41.9 ANXIETY: Primary | ICD-10-CM

## 2023-03-15 DIAGNOSIS — F33.0 MILD EPISODE OF RECURRENT MAJOR DEPRESSIVE DISORDER: ICD-10-CM

## 2023-03-15 DIAGNOSIS — R63.4 WEIGHT LOSS: ICD-10-CM

## 2023-03-15 DIAGNOSIS — R42 DIZZINESS: ICD-10-CM

## 2023-03-15 PROCEDURE — 3079F DIAST BP 80-89 MM HG: CPT | Mod: CPTII,,, | Performed by: FAMILY MEDICINE

## 2023-03-15 PROCEDURE — 3074F PR MOST RECENT SYSTOLIC BLOOD PRESSURE < 130 MM HG: ICD-10-PCS | Mod: CPTII,,, | Performed by: FAMILY MEDICINE

## 2023-03-15 PROCEDURE — 99214 OFFICE O/P EST MOD 30 MIN: CPT | Mod: S$PBB,,, | Performed by: FAMILY MEDICINE

## 2023-03-15 PROCEDURE — 1159F MED LIST DOCD IN RCRD: CPT | Mod: CPTII,,, | Performed by: FAMILY MEDICINE

## 2023-03-15 PROCEDURE — 90471 IMMUNIZATION ADMIN: CPT | Mod: PBBFAC,PN

## 2023-03-15 PROCEDURE — 99999 PR PBB SHADOW E&M-EST. PATIENT-LVL III: CPT | Mod: PBBFAC,,, | Performed by: FAMILY MEDICINE

## 2023-03-15 PROCEDURE — 99214 PR OFFICE/OUTPT VISIT, EST, LEVL IV, 30-39 MIN: ICD-10-PCS | Mod: S$PBB,,, | Performed by: FAMILY MEDICINE

## 2023-03-15 PROCEDURE — 3074F SYST BP LT 130 MM HG: CPT | Mod: CPTII,,, | Performed by: FAMILY MEDICINE

## 2023-03-15 PROCEDURE — 3079F PR MOST RECENT DIASTOLIC BLOOD PRESSURE 80-89 MM HG: ICD-10-PCS | Mod: CPTII,,, | Performed by: FAMILY MEDICINE

## 2023-03-15 PROCEDURE — 90670 PCV13 VACCINE IM: CPT | Mod: PBBFAC,PN

## 2023-03-15 PROCEDURE — 86580 TB INTRADERMAL TEST: CPT | Mod: PBBFAC,PN

## 2023-03-15 PROCEDURE — 99213 OFFICE O/P EST LOW 20 MIN: CPT | Mod: PBBFAC,PN | Performed by: FAMILY MEDICINE

## 2023-03-15 PROCEDURE — 3008F BODY MASS INDEX DOCD: CPT | Mod: CPTII,,, | Performed by: FAMILY MEDICINE

## 2023-03-15 PROCEDURE — 99999 PR PBB SHADOW E&M-EST. PATIENT-LVL III: ICD-10-PCS | Mod: PBBFAC,,, | Performed by: FAMILY MEDICINE

## 2023-03-15 PROCEDURE — 3008F PR BODY MASS INDEX (BMI) DOCUMENTED: ICD-10-PCS | Mod: CPTII,,, | Performed by: FAMILY MEDICINE

## 2023-03-15 PROCEDURE — 1159F PR MEDICATION LIST DOCUMENTED IN MEDICAL RECORD: ICD-10-PCS | Mod: CPTII,,, | Performed by: FAMILY MEDICINE

## 2023-03-15 RX ORDER — MUPIROCIN 20 MG/G
OINTMENT TOPICAL 3 TIMES DAILY
Qty: 22 G | Refills: 1 | Status: SHIPPED | OUTPATIENT
Start: 2023-03-15

## 2023-03-15 RX ORDER — GABAPENTIN 300 MG/1
300 CAPSULE ORAL 3 TIMES DAILY
Qty: 90 CAPSULE | Refills: 5 | Status: SHIPPED | OUTPATIENT
Start: 2023-03-15 | End: 2024-03-14
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
## 2023-04-17 ENCOUNTER — TELEPHONE (OUTPATIENT)
Dept: DIABETES | Facility: CLINIC | Age: 28
End: 2023-04-17
Payer: MEDICAID

## 2023-05-09 ENCOUNTER — TELEPHONE (OUTPATIENT)
Dept: DIABETES | Facility: CLINIC | Age: 28
End: 2023-05-09
Payer: MEDICAID

## 2023-09-18 ENCOUNTER — PATIENT MESSAGE (OUTPATIENT)
Dept: PRIMARY CARE CLINIC | Facility: CLINIC | Age: 28
End: 2023-09-18
Payer: MEDICAID

## 2023-10-18 ENCOUNTER — PATIENT MESSAGE (OUTPATIENT)
Dept: CARDIOLOGY | Facility: CLINIC | Age: 28
End: 2023-10-18
Payer: MEDICAID

## 2023-10-21 ENCOUNTER — NURSE TRIAGE (OUTPATIENT)
Dept: ADMINISTRATIVE | Facility: CLINIC | Age: 28
End: 2023-10-21
Payer: MEDICAID

## 2023-10-21 PROBLEM — R33.9 URINARY RETENTION: Status: ACTIVE | Noted: 2023-10-21

## 2023-10-21 PROBLEM — R10.9 ABDOMINAL PAIN: Status: ACTIVE | Noted: 2023-10-21

## 2023-10-21 NOTE — TELEPHONE ENCOUNTER
"Reason for Disposition   [1] Unable to urinate (or only a few drops) > 4 hours AND [2] bladder feels very full (e.g., palpable bladder or strong urge to urinate)   Patient sounds very sick or weak to the triager    Additional Information   Negative: Shock suspected (e.g., cold/pale/clammy skin, too weak to stand, low BP, rapid pulse)   Negative: Sounds like a life-threatening emergency to the triager   Negative: Followed a female genital area injury (e.g., labia, vagina, vulva)   Negative: Followed a male genital area injury (e.g., penis, scrotum)   Negative: Vaginal discharge   Negative: Pus (white, yellow) or bloody discharge from end of penis   Negative: [1] Taking antibiotic for urinary tract infection (UTI) AND [2] male   Negative: [1] Pain or burning with passing urine (urination) AND [2] pregnant   Negative: [1] Pain or burning with passing urine (urination) AND [2] postpartum (from 0 to 6 weeks after delivery)   Negative: [1] Pain or burning with passing urine (urination) AND [2] female   Negative: [1] Pain or burning with passing urine (urination) AND [2] male   Negative: Pain or itching in the vulvar area   Negative: Pain in scrotum is main symptom   Negative: Blood in the urine is main symptom   Negative: Symptoms arising from use of a urinary catheter (e.g., Coude, Donnelly)   Negative: Shock suspected (e.g., cold/pale/clammy skin, too weak to stand, low BP, rapid pulse)   Negative: Difficult to awaken or acting confused (e.g., disoriented, slurred speech)   Negative: Passed out (i.e., lost consciousness, collapsed and was not responding)   Negative: [1] Vomiting AND [2] contains red blood or black ("coffee ground") material  (Exception: Few red streaks in vomit that only happened once.)   Negative: Sounds like a life-threatening emergency to the triager   Negative: Diarrhea is main symptom   Negative: Stool color other than brown or tan is main concern  (no bleeding and no melena)   Negative: SEVERE rectal " "bleeding (large blood clots; constant or on and off bleeding)   Negative: SEVERE dizziness (e.g., unable to stand, requires support to walk, feels like passing out now)   Negative: [1] MODERATE rectal bleeding (small blood clots, passing blood without stool, or toilet water turns red) AND [2] more than once a day   Negative: Pale skin (pallor) of new-onset or worsening   Negative: Black or tarry bowel movements  (Exception: Chronic-unchanged black-grey BMs AND is taking iron pills or Pepto-Bismol.)   Negative: [1] Constant abdominal pain AND [2] present > 2 hours   Negative: Rectal foreign body (i.e., now or within past week;  inserted or swallowed)   Negative: High-risk adult (e.g., prior surgery on aorta, abdominal aortic aneurysm)   Negative: Taking Coumadin (warfarin) or other strong blood thinner, or known bleeding disorder (e.g., thrombocytopenia)   Negative: Known cirrhosis of the liver (or history of liver failure or ascites)   Negative: [1] Colonoscopy AND [2] in past 72 hours    Protocols used: Urinary Symptoms-A-AH, Rectal Bleeding-A-AH    Navneet has history of atrophic kidney, and reports that for the last day he has been unable to fully empty his bladder.  He said that he has to "really bear down and force the urine to come out", and he also reports a feeling of abdominal bloating, abdominal discomfort / pain.  He said he has had a history of "stomach problems all my life". He also states that he feels a swelling all around his rectum, states "my anus is swollen, and when I had BM this morning there was bleeding".  Per [facility] triage protocol for urinary retention, unable to empty bladder, recommend ED now.  Of note, he states he has changed PCPs and is now with Dr Nabil Kaba, with [facility], as it was too hard to get to Dr Alejandre's office. He has been seeing Dr Kaba as pcp since 06/2023.  Encouraged Navneet to follow up with Dr Kaba by phone this week for pcp recommendations " post-ED visit today.  He states he will.  Message to Dr Kaba by electronic fax.  Please contact Navneet steen, directly with any additional care advice.

## 2023-11-12 ENCOUNTER — PATIENT MESSAGE (OUTPATIENT)
Dept: DIABETES | Facility: CLINIC | Age: 28
End: 2023-11-12
Payer: MEDICAID

## 2024-02-06 ENCOUNTER — E-VISIT (OUTPATIENT)
Dept: PRIMARY CARE CLINIC | Facility: CLINIC | Age: 29
End: 2024-02-06
Payer: MEDICAID

## 2024-02-06 DIAGNOSIS — Z53.21 PATIENT LEFT WITHOUT BEING SEEN: Primary | ICD-10-CM

## 2024-02-06 PROCEDURE — 99499 UNLISTED E&M SERVICE: CPT | Mod: 95,,, | Performed by: STUDENT IN AN ORGANIZED HEALTH CARE EDUCATION/TRAINING PROGRAM

## 2024-02-06 NOTE — PROGRESS NOTES
Evaluating for blood clot or other circulatory issues is not possible in an E visit.    This E visit has been denied.  Would recommend making a appointment with your PCP to discuss your present concerns.    - Dr. Ayoub

## 2024-07-29 ENCOUNTER — TELEPHONE (OUTPATIENT)
Dept: PRIMARY CARE CLINIC | Facility: CLINIC | Age: 29
End: 2024-07-29
Payer: MEDICAID

## 2024-07-29 NOTE — TELEPHONE ENCOUNTER
----- Message from Ramonita Lugo sent at 7/29/2024  8:01 AM CDT -----  Contact: [phone]  1MEDICALADVICE     Patient is calling for Medical Advice regarding: Patient has yellowish/ red mucus  which he thinks is a sinus infection that he is coughing up. Patient is out of town now and would like something called in for him. Also draining . And his breathing is congested.    How long has patient had these symptoms: this morning    Pharmacy name and phone#: [facility], #[id number], [place], [place], [address] [phone]     Patient wants a call back or thru [facility]:CALL    Comments:Please call patient to advise.

## 2024-09-19 ENCOUNTER — PATIENT MESSAGE (OUTPATIENT)
Dept: PRIMARY CARE CLINIC | Facility: CLINIC | Age: 29
End: 2024-09-19
Payer: MEDICAID